# Patient Record
Sex: FEMALE | Employment: FULL TIME | URBAN - METROPOLITAN AREA
[De-identification: names, ages, dates, MRNs, and addresses within clinical notes are randomized per-mention and may not be internally consistent; named-entity substitution may affect disease eponyms.]

---

## 2017-06-15 ENCOUNTER — ALLIED HEALTH/NURSE VISIT (OUTPATIENT)
Dept: NEUROLOGY | Facility: CLINIC | Age: 22
DRG: 101 | End: 2017-06-15
Attending: PSYCHIATRY & NEUROLOGY
Payer: COMMERCIAL

## 2017-06-15 ENCOUNTER — APPOINTMENT (OUTPATIENT)
Dept: CT IMAGING | Facility: CLINIC | Age: 22
DRG: 101 | End: 2017-06-15
Attending: PSYCHIATRY & NEUROLOGY
Payer: COMMERCIAL

## 2017-06-15 ENCOUNTER — HOSPITAL ENCOUNTER (INPATIENT)
Facility: CLINIC | Age: 22
LOS: 1 days | Discharge: HOME | DRG: 101 | End: 2017-06-16
Attending: EMERGENCY MEDICINE | Admitting: PSYCHIATRY & NEUROLOGY
Payer: COMMERCIAL

## 2017-06-15 ENCOUNTER — DOCUMENTATION ONLY (OUTPATIENT)
Dept: NEUROLOGY | Facility: CLINIC | Age: 22
End: 2017-06-15

## 2017-06-15 DIAGNOSIS — R56.9 SEIZURE (H): Primary | ICD-10-CM

## 2017-06-15 DIAGNOSIS — F41.9 ANXIETY: Primary | ICD-10-CM

## 2017-06-15 DIAGNOSIS — R56.9 CONVULSION (H): ICD-10-CM

## 2017-06-15 PROBLEM — R40.4 ALTERED LEVEL OF CONSCIOUSNESS: Status: ACTIVE | Noted: 2017-06-14

## 2017-06-15 PROBLEM — G47.00 INSOMNIA: Status: ACTIVE | Noted: 2017-06-08

## 2017-06-15 PROBLEM — F32.A DEPRESSION: Status: ACTIVE | Noted: 2017-06-08

## 2017-06-15 PROBLEM — R46.89 ABNORMAL BEHAVIOR: Status: ACTIVE | Noted: 2017-06-08

## 2017-06-15 PROBLEM — R53.1 WEAKNESS OF LEFT SIDE OF BODY: Status: ACTIVE | Noted: 2017-04-22

## 2017-06-15 PROBLEM — Z86.19 HISTORY OF VARICELLA: Status: ACTIVE | Noted: 2017-06-15

## 2017-06-15 LAB
ANION GAP SERPL CALCULATED.3IONS-SCNC: 9 MMOL/L (ref 3–14)
BASOPHILS # BLD AUTO: 0 10E9/L (ref 0–0.2)
BASOPHILS NFR BLD AUTO: 0.3 %
BUN SERPL-MCNC: 11 MG/DL (ref 7–30)
CALCIUM SERPL-MCNC: 8.6 MG/DL (ref 8.5–10.1)
CHLORIDE SERPL-SCNC: 104 MMOL/L (ref 94–109)
CO2 BLDCOV-SCNC: 25 MMOL/L (ref 21–28)
CO2 SERPL-SCNC: 25 MMOL/L (ref 20–32)
CREAT BLD-MCNC: 0.5 MG/DL (ref 0.52–1.04)
CREAT SERPL-MCNC: 0.58 MG/DL (ref 0.52–1.04)
DIFFERENTIAL METHOD BLD: NORMAL
EOSINOPHIL # BLD AUTO: 0 10E9/L (ref 0–0.7)
EOSINOPHIL NFR BLD AUTO: 0.4 %
ERYTHROCYTE [DISTWIDTH] IN BLOOD BY AUTOMATED COUNT: 13 % (ref 10–15)
GFR SERPL CREATININE-BSD FRML MDRD: >90 ML/MIN/1.7M2
GFR SERPL CREATININE-BSD FRML MDRD: NORMAL ML/MIN/1.7M2
GLUCOSE SERPL-MCNC: 92 MG/DL (ref 70–99)
HCT VFR BLD AUTO: 42.6 % (ref 35–47)
HGB BLD-MCNC: 14.1 G/DL (ref 11.7–15.7)
IMM GRANULOCYTES # BLD: 0 10E9/L (ref 0–0.4)
IMM GRANULOCYTES NFR BLD: 0.1 %
LACTATE BLD-SCNC: 1.1 MMOL/L (ref 0.7–2.1)
LYMPHOCYTES # BLD AUTO: 2.7 10E9/L (ref 0.8–5.3)
LYMPHOCYTES NFR BLD AUTO: 34 %
MAGNESIUM SERPL-MCNC: 2.4 MG/DL (ref 1.6–2.3)
MCH RBC QN AUTO: 29.8 PG (ref 26.5–33)
MCHC RBC AUTO-ENTMCNC: 33.1 G/DL (ref 31.5–36.5)
MCV RBC AUTO: 90 FL (ref 78–100)
MONOCYTES # BLD AUTO: 0.6 10E9/L (ref 0–1.3)
MONOCYTES NFR BLD AUTO: 7.8 %
NEUTROPHILS # BLD AUTO: 4.5 10E9/L (ref 1.6–8.3)
NEUTROPHILS NFR BLD AUTO: 57.4 %
NRBC # BLD AUTO: 0 10*3/UL
NRBC BLD AUTO-RTO: 0 /100
PCO2 BLDV: 45 MM HG (ref 40–50)
PH BLDV: 7.35 PH (ref 7.32–7.43)
PHOSPHATE SERPL-MCNC: 3.2 MG/DL (ref 2.5–4.5)
PLATELET # BLD AUTO: 220 10E9/L (ref 150–450)
PO2 BLDV: 31 MM HG (ref 25–47)
POTASSIUM SERPL-SCNC: 4.1 MMOL/L (ref 3.4–5.3)
RBC # BLD AUTO: 4.73 10E12/L (ref 3.8–5.2)
SAO2 % BLDV FROM PO2: 57 %
SODIUM SERPL-SCNC: 138 MMOL/L (ref 133–144)
TROPONIN I BLD-MCNC: 0 UG/L (ref 0–0.1)
WBC # BLD AUTO: 7.8 10E9/L (ref 4–11)

## 2017-06-15 PROCEDURE — 95951 ZZHC EEG VIDEO < 12 HR: CPT | Mod: 52,ZF

## 2017-06-15 PROCEDURE — 84484 ASSAY OF TROPONIN QUANT: CPT

## 2017-06-15 PROCEDURE — 25000132 ZZH RX MED GY IP 250 OP 250 PS 637: Performed by: PSYCHIATRY & NEUROLOGY

## 2017-06-15 PROCEDURE — 82565 ASSAY OF CREATININE: CPT

## 2017-06-15 PROCEDURE — 99285 EMERGENCY DEPT VISIT HI MDM: CPT | Mod: Z6 | Performed by: EMERGENCY MEDICINE

## 2017-06-15 PROCEDURE — 80175 DRUG SCREEN QUAN LAMOTRIGINE: CPT | Performed by: EMERGENCY MEDICINE

## 2017-06-15 PROCEDURE — 84100 ASSAY OF PHOSPHORUS: CPT | Performed by: EMERGENCY MEDICINE

## 2017-06-15 PROCEDURE — 12000001 ZZH R&B MED SURG/OB UMMC

## 2017-06-15 PROCEDURE — 82803 BLOOD GASES ANY COMBINATION: CPT

## 2017-06-15 PROCEDURE — 83605 ASSAY OF LACTIC ACID: CPT

## 2017-06-15 PROCEDURE — 99285 EMERGENCY DEPT VISIT HI MDM: CPT | Mod: 25

## 2017-06-15 PROCEDURE — 83735 ASSAY OF MAGNESIUM: CPT | Performed by: EMERGENCY MEDICINE

## 2017-06-15 PROCEDURE — 25000128 H RX IP 250 OP 636: Performed by: PSYCHIATRY & NEUROLOGY

## 2017-06-15 PROCEDURE — 85025 COMPLETE CBC W/AUTO DIFF WBC: CPT | Performed by: EMERGENCY MEDICINE

## 2017-06-15 PROCEDURE — 70450 CT HEAD/BRAIN W/O DYE: CPT

## 2017-06-15 PROCEDURE — 80048 BASIC METABOLIC PNL TOTAL CA: CPT | Performed by: EMERGENCY MEDICINE

## 2017-06-15 RX ORDER — ONDANSETRON 2 MG/ML
4 INJECTION INTRAMUSCULAR; INTRAVENOUS EVERY 6 HOURS PRN
Status: DISCONTINUED | OUTPATIENT
Start: 2017-06-15 | End: 2017-06-16 | Stop reason: HOSPADM

## 2017-06-15 RX ORDER — LAMOTRIGINE 25 MG/1
100 TABLET ORAL 2 TIMES DAILY
Status: DISCONTINUED | OUTPATIENT
Start: 2017-06-15 | End: 2017-06-16 | Stop reason: HOSPADM

## 2017-06-15 RX ORDER — KETOROLAC TROMETHAMINE 30 MG/ML
15 INJECTION, SOLUTION INTRAMUSCULAR; INTRAVENOUS EVERY 6 HOURS PRN
Status: DISCONTINUED | OUTPATIENT
Start: 2017-06-15 | End: 2017-06-16 | Stop reason: HOSPADM

## 2017-06-15 RX ORDER — POLYETHYLENE GLYCOL 3350 17 G/17G
17 POWDER, FOR SOLUTION ORAL DAILY PRN
Status: DISCONTINUED | OUTPATIENT
Start: 2017-06-15 | End: 2017-06-16 | Stop reason: HOSPADM

## 2017-06-15 RX ORDER — POTASSIUM CHLORIDE 1.5 G/1.58G
20-40 POWDER, FOR SOLUTION ORAL
Status: DISCONTINUED | OUTPATIENT
Start: 2017-06-15 | End: 2017-06-16 | Stop reason: HOSPADM

## 2017-06-15 RX ORDER — DIPHENHYDRAMINE HYDROCHLORIDE 50 MG/ML
25 INJECTION INTRAMUSCULAR; INTRAVENOUS ONCE
Status: COMPLETED | OUTPATIENT
Start: 2017-06-15 | End: 2017-06-15

## 2017-06-15 RX ORDER — ONDANSETRON 4 MG/1
4 TABLET, ORALLY DISINTEGRATING ORAL EVERY 6 HOURS PRN
Status: DISCONTINUED | OUTPATIENT
Start: 2017-06-15 | End: 2017-06-16 | Stop reason: HOSPADM

## 2017-06-15 RX ORDER — POTASSIUM CHLORIDE 29.8 MG/ML
20 INJECTION INTRAVENOUS
Status: DISCONTINUED | OUTPATIENT
Start: 2017-06-15 | End: 2017-06-16 | Stop reason: HOSPADM

## 2017-06-15 RX ORDER — CITALOPRAM HYDROBROMIDE 20 MG/1
TABLET ORAL
COMMUNITY
End: 2019-08-28

## 2017-06-15 RX ORDER — PROCHLORPERAZINE 25 MG
25 SUPPOSITORY, RECTAL RECTAL EVERY 12 HOURS PRN
Status: DISCONTINUED | OUTPATIENT
Start: 2017-06-15 | End: 2017-06-16 | Stop reason: HOSPADM

## 2017-06-15 RX ORDER — ACETAMINOPHEN 325 MG/1
650 TABLET ORAL EVERY 4 HOURS PRN
Status: DISCONTINUED | OUTPATIENT
Start: 2017-06-15 | End: 2017-06-16 | Stop reason: HOSPADM

## 2017-06-15 RX ORDER — POTASSIUM CHLORIDE 7.45 MG/ML
10 INJECTION INTRAVENOUS
Status: DISCONTINUED | OUTPATIENT
Start: 2017-06-15 | End: 2017-06-16 | Stop reason: HOSPADM

## 2017-06-15 RX ORDER — AMOXICILLIN 250 MG
1-2 CAPSULE ORAL 2 TIMES DAILY PRN
Status: DISCONTINUED | OUTPATIENT
Start: 2017-06-15 | End: 2017-06-16 | Stop reason: HOSPADM

## 2017-06-15 RX ORDER — POTASSIUM CHLORIDE 750 MG/1
20-40 TABLET, EXTENDED RELEASE ORAL
Status: DISCONTINUED | OUTPATIENT
Start: 2017-06-15 | End: 2017-06-16 | Stop reason: HOSPADM

## 2017-06-15 RX ORDER — PROCHLORPERAZINE MALEATE 5 MG
5-10 TABLET ORAL EVERY 6 HOURS PRN
Status: DISCONTINUED | OUTPATIENT
Start: 2017-06-15 | End: 2017-06-16 | Stop reason: HOSPADM

## 2017-06-15 RX ORDER — POTASSIUM CL/LIDO/0.9 % NACL 10MEQ/0.1L
10 INTRAVENOUS SOLUTION, PIGGYBACK (ML) INTRAVENOUS
Status: DISCONTINUED | OUTPATIENT
Start: 2017-06-15 | End: 2017-06-16 | Stop reason: HOSPADM

## 2017-06-15 RX ORDER — MAGNESIUM SULFATE HEPTAHYDRATE 40 MG/ML
4 INJECTION, SOLUTION INTRAVENOUS EVERY 4 HOURS PRN
Status: DISCONTINUED | OUTPATIENT
Start: 2017-06-15 | End: 2017-06-16 | Stop reason: HOSPADM

## 2017-06-15 RX ORDER — LAMOTRIGINE 100 MG/1
100 TABLET ORAL 2 TIMES DAILY
Status: ON HOLD | COMMUNITY
End: 2019-08-30

## 2017-06-15 RX ORDER — NALOXONE HYDROCHLORIDE 0.4 MG/ML
.1-.4 INJECTION, SOLUTION INTRAMUSCULAR; INTRAVENOUS; SUBCUTANEOUS
Status: DISCONTINUED | OUTPATIENT
Start: 2017-06-15 | End: 2017-06-16 | Stop reason: HOSPADM

## 2017-06-15 RX ADMIN — LAMOTRIGINE 100 MG: 25 TABLET ORAL at 20:18

## 2017-06-15 RX ADMIN — PROCHLORPERAZINE EDISYLATE 10 MG: 5 INJECTION INTRAMUSCULAR; INTRAVENOUS at 23:55

## 2017-06-15 RX ADMIN — KETOROLAC TROMETHAMINE 15 MG: 30 INJECTION, SOLUTION INTRAMUSCULAR at 22:23

## 2017-06-15 RX ADMIN — ACETAMINOPHEN 650 MG: 325 TABLET, FILM COATED ORAL at 18:11

## 2017-06-15 RX ADMIN — ACETAMINOPHEN 650 MG: 325 TABLET, FILM COATED ORAL at 22:23

## 2017-06-15 RX ADMIN — DIPHENHYDRAMINE HYDROCHLORIDE 25 MG: 50 INJECTION, SOLUTION INTRAMUSCULAR; INTRAVENOUS at 23:54

## 2017-06-15 ASSESSMENT — VISUAL ACUITY
OU: BLURRED VISION

## 2017-06-15 ASSESSMENT — ENCOUNTER SYMPTOMS
NAUSEA: 0
FEVER: 0
VOMITING: 0
NUMBNESS: 1
WEAKNESS: 1
ABDOMINAL PAIN: 0
CHILLS: 0
HEADACHES: 0
SHORTNESS OF BREATH: 0

## 2017-06-15 ASSESSMENT — ACTIVITIES OF DAILY LIVING (ADL)
FALL_HISTORY_WITHIN_LAST_SIX_MONTHS: NO
RETIRED_COMMUNICATION: 0-->UNDERSTANDS/COMMUNICATES WITHOUT DIFFICULTY
COGNITION: 0 - NO COGNITION ISSUES REPORTED
SWALLOWING: 0-->SWALLOWS FOODS/LIQUIDS WITHOUT DIFFICULTY
RETIRED_EATING: 0-->INDEPENDENT
BATHING: 0-->INDEPENDENT
AMBULATION: 0-->INDEPENDENT
DRESS: 0-->INDEPENDENT
TOILETING: 0-->INDEPENDENT
TRANSFERRING: 0-->INDEPENDENT

## 2017-06-15 NOTE — CONSULTS
"[facility], [place]      Neurology Stroke Code - Canceled    Patient Name: Libra Lieberman  : 1995 MRN#: [id number]    STROKE DATA     Stroke Code:  Time called:  06/15/2017 1402  Time patient seen:  06/15/2017 1404  Onset of symptoms:  06/15/2017 0600  Last known normal (pt's baseline):  17 2200  Head CT read by Dr. Yousif at:  06/15/2017 1410     Stroke Code canceled at 1430 (time): symptoms not likely a stroke.    Discussed why stroke code canceled with ED physician.    TPA treatment:  Not given due to stroke mimic: conversion.    National Institutes of Health Stroke Scale (at presentation)  NIHSS done at:  time patient seen      Score    Level of consciousness:  (0)   Alert, keenly responsive    LOC questions:  (0)   Answers both questions correctly    LOC commands:  (0)   Performs both tasks correctly    Best gaze:  (0)   Normal    Visual:  (0)   No visual loss    Facial palsy:  (0)   Normal symmetrical movements    Motor arm (left):  (1)   Drift    Motor arm (right):  (0)   No drift    Motor leg (left):  (1)   Drift    Motor leg (right):  (0)   No drift    Limb ataxia:  (0)   Absent    Sensory:  (0)   Normal- no sensory loss    Best language:  (0)   Normal- no aphasia    Dysarthria:  (0)   Normal    Extinction and inattention:  (0)   No abnormality        NIHSS Total Score:  2           ASSESSMENT & RECOMMENDATONS     Stroke code activated due to RUE tingling, BL visual loss. Pt woke up at 6AM with BL black spots in vision that progressively worsened to near-\"blindness.\" still present with each eye closed one at a time. Has hx of neurocysticercosis (confirmed on CTH). Exam c/f conversion reaction with BL symptoms, inconsistent exam lateralization, changing exam for different examiners. Low c/f stroke at this time. Pt said she had a prior seizure wherein she woke up with full body numbness. Sees a neurologist.     Recommendations:   Stroke will sign off at this time. " General neurology consult as patient does have hx of neurocysticercosis and seizure disorder from this on lamictal. Neurology to address possibility of seizures causing symptoms.     The patient was discussed with the Fellow, Dr. Alexander.  The staff is Dr. Álvarez.    Juan Jose Yousif  PGY2 Neurology  [phone]

## 2017-06-15 NOTE — PHARMACY-CONSULT NOTE
Pharmacy Stroke Code Response  Pharmacist responded as part of the Stroke Code Team activation to patient care area -ED and was immediately dismissed, no tPA needed.    Stroke Code was later cancelled.    Virginia Loaiza, PharmD, BCCCP

## 2017-06-15 NOTE — SIGNIFICANT EVENT
Arrival to Stroke Code:1405    Stroke Team escalate/de-escalate interventions: CT, stroke code de-escalated    ED/Bedside Nurse providing handoff: MILY Dunaway    Time left for CT: 1408    Time Returned to ED/Unit: 1418    ED/Bedside Nurse given handoff to & Time: MILY Dunaway at 1418

## 2017-06-15 NOTE — ED PROVIDER NOTES
History     Chief Complaint   Patient presents with     Numbness     HPI  Libra Lieberman is a 21 year old female with a history of neurocysticercosis, BPPV, and seizures who presents for evaluation of numbness. The patient awoke this morning with blurry vision persisting, and within past 1 hour she also developed black spots in vision. No hx of visual problems. Also, at around 10:30 AM today developed persisting tingling and numbness in right hand. These sx concerned her, prompting ER visit.  Prior to today was in her usual state of health. Was recently hospitalized at [facility] on 6/7/17 for seizure vs pseudoseizure spells where she had a normal awake EEG. Pt reports residual weakness in left side 3 days following above seizure vs pseudoseizure event, but which resolved totally prior to today.    I have reviewed the Medications, Allergies, Past Medical and Surgical History, and Social History in the Epic system.    Current Facility-Administered Medications   Medication     lamoTRIgine (LaMICtal) tablet 100 mg     naloxone (NARCAN) injection 0.1-0.4 mg     acetaminophen (TYLENOL) tablet 650 mg     senna-docusate (SENOKOT-S;PERICOLACE) 8.6-50 MG per tablet 1-2 tablet     polyethylene glycol (MIRALAX/GLYCOLAX) Packet 17 g     ondansetron (ZOFRAN-ODT) ODT tab 4 mg    Or     ondansetron (ZOFRAN) injection 4 mg     prochlorperazine (COMPAZINE) injection 5-10 mg    Or     prochlorperazine (COMPAZINE) tablet 5-10 mg    Or     prochlorperazine (COMPAZINE) Suppository 25 mg     potassium chloride SA (K-DUR/KLOR-CON M) CR tablet 20-40 mEq     potassium chloride (KLOR-CON) Packet 20-40 mEq     potassium chloride 10 mEq in 100 mL intermittent infusion     potassium chloride 10 mEq in 100 mL intermittent infusion with 10 mg lidocaine     potassium chloride 20 mEq in 50 mL intermittent infusion     magnesium sulfate 4 g in 100 mL sterile water (premade)     Past Medical History:   Diagnosis Date     H/O magnetic  resonance imaging of brain and brain stem 6/15/2017    MRI BRAIN [facility] CON4/21/2017 [facility] Result Impression IMPRESSION: Chronic sequela of remote neurocysticercosis at the right middle frontal gyrus, stable. Calcification seen left anterior temporal lobe, better seen on prior CT. No new intracranial lesion identified. Result Narrative EXAM: MRI BRAIN WITHOUT AND WITH CONTRAST, 4/21/2017  CLINICAL DATA: History of neurocysticercosi     Neurocysticercosis 2006       History reviewed. No pertinent surgical history.    Family History   Problem Relation Age of Onset     Hypertension Mother      DIABETES Mother      Depression Mother      Lipids Mother      Asthma Brother      Cancer - colorectal Other 13     colon cancer     Thyroid Disease No family hx of      HEART DISEASE No family hx of      Unknown/Adopted No family hx of      Family History Negative No family hx of      C.A.D. No family hx of      CEREBROVASCULAR DISEASE No family hx of      Breast Cancer No family hx of      Prostate Cancer No family hx of      Alcohol/Drug No family hx of      Allergies No family hx of      Alzheimer Disease No family hx of      Anesthesia Reaction No family hx of      Arthritis No family hx of      Blood Disease No family hx of      CANCER No family hx of      Cardiovascular No family hx of      Circulatory No family hx of      Congenital Anomalies No family hx of      Connective Tissue Disorder No family hx of      Endocrine Disease No family hx of      Eye Disorder No family hx of      Genetic Disorder No family hx of      GASTROINTESTINAL DISEASE No family hx of      Genitourinary Problems No family hx of      Gynecology No family hx of      Musculoskeletal Disorder No family hx of      Neurologic Disorder No family hx of      Obesity No family hx of      OSTEOPOROSIS No family hx of      Psychotic Disorder No family hx of      Respiratory No family hx of      Hearing Loss No family hx of        Social  History   Substance Use Topics     Smoking status: Never Smoker     Smokeless tobacco: Never Used     Alcohol use No        Allergies   Allergen Reactions     Bupropion      Other reaction(s): Seizures  Per patient     Levetiracetam Anxiety     Other reaction(s): Behavioral Disturbances   Developed worsening depression, reduced appetite   isturbances       Review of Systems   Constitutional: Negative for chills and fever.   Eyes: Positive for visual disturbance.   Respiratory: Negative for shortness of breath.    Cardiovascular: Negative for chest pain.   Gastrointestinal: Negative for abdominal pain, nausea and vomiting.   Neurological: Positive for weakness (see HPI) and numbness (see HPI). Negative for headaches.   All other systems reviewed and are negative.      Physical Exam     ED Triage Vitals   Enc Vitals Group      BP 06/15/17 1353 113/74      Pulse 06/15/17 1353 95      Resp 06/15/17 1353 18      Temp 06/15/17 1353 99  F (37.2  C)      Temp src 06/15/17 1353 Axillary      SpO2 06/15/17 1353 99 %      Weight 06/15/17 1353 78.3 kg (172 lb 9.9 oz)      Height --       Head Cir --       Peak Flow --       Pain Score --       Pain Loc --       Pain Edu? --       Excl. in GC? --         Physical Exam  GEN: Well appearing, non toxic, cooperative and conversant.    HEENT: The head is normocephalic and atraumatic. Pupils are equal round and reactive to light. Extraocular motions are intact. There is no facial swelling. The neck is nontender and supple.   CV: Regular rate and rhythm without murmurs rubs or gallops. 2+ radial pulses bilaterally.  PULM: Clear to auscultation bilaterally.  ABD: Soft, nontender, nondistended. Normal bowel sounds.   EXT: Weakness in left and right hand, likely volitional. Seems to trigger, decreased strength likely related to effort. SILT.  NEURO: Cranial nerves II through XII are intact and symmetric.   SKIN: No rashes, ecchymosis, or lacerations  PSYCH: Calm and cooperative,  interactive.     ED Course     ED Course     Procedures        Recent Results (from the past 24 hour(s))   CT Head w/o Contrast    Narrative    CT HEAD W/O CONTRAST 6/15/2017 2:18 PM    Provided History: Neurocysticercosis.    Comparison: None available.    Technique: Using multidetector thin collimation helical acquisition  technique, axial, coronal and sagittal CT images from the skull base  to the vertex were obtained without intravenous contrast.     Findings:    Cystic focus in the anterior right frontal lobe measuring 1.3 x 1.1 cm  with a punctate internal 2 mm hyperdensity, and no adjacent vasogenic  edema. 4 mm calcification in the anterior left temporal lobe. No  intracranial hemorrhage, mass effect, or midline shift. The ventricles  are proportionate to the cerebral sulci. The gray to white matter  differentiation of the cerebral hemispheres is preserved. The basal  cisterns are patent.    The visualized paranasal sinuses are clear. The mastoid air cells are  clear.       Impression    Impression:   Neurocysticercosis with evidence of vesicular and chronic calcified  stages.    I have personally reviewed the examination and initial interpretation  and I agree with the findings.    TIM ANDERSON MD            Labs Ordered and Resulted from Time of ED Arrival Up to the Time of Departure from the ED   CREATININE POCT - Abnormal; Notable for the following:        Result Value    Creatinine 0.5 (*)     All other components within normal limits   CBC WITH PLATELETS DIFFERENTIAL   BASIC METABOLIC PANEL   ISTAT  GASES LACTATE SIMON POCT   TROPONIN POCT            Assessments & Plan (with Medical Decision Making)   21-year-old female with history of neurocysticercosis , multiple hospitalizations for evaluation of convulsions, seizure, right-sided weakness.   Prior differential is considered including seizure as well as pseudoseizure. She is known to have a negative EEG, normal CT, and no recent MRIs documented.  The  patient presented to the Emergency Department with clinical signs and symptoms filling criteria for a stroke code activation, which was performed.  Soon afterwards, upon further evaluation and results of her non-con head CT, stroke code was canceled.  In the Emergency Department the patient remained cooperative. Her serial neuro examinations continued to reveal an inconsistent exam, demonstrating occasional right-sided as well as left-sided weakness, highly inconsistent. The patient is alert, easy to engage with, and appears to be cooperative at this time with me    The patient had a large unremarkable work-up at the Emergency Department, including negative head CT.  Discussed with Neurology, she ll be admitted for observation and repeat EEG video monitoring as well as Psychiatry consult for consideration of somatization and conversation disorder.  The patient is agreeable with our plan.    I have reviewed the nursing notes.    I have reviewed the findings, diagnosis, plan and need for follow up with the patient.    Current Discharge Medication List          Final diagnoses:   Convulsion (H)     IRyley, am serving as a trained medical scribe to document services personally performed by Eleazar Clay MD, based on the provider's statements to me.      Eleazar MCNAMARA MD, was physically present and have reviewed and verified the accuracy of this note documented by Ryley Robert.    6/15/2017   [facility], [place], EMERGENCY DEPARTMENT     Eleazar Clay MD  06/15/17 [id number]

## 2017-06-15 NOTE — PHARMACY-ADMISSION MEDICATION HISTORY
Admission medication history interview status for the 6/15/2017 admission is complete. See Epic admission navigator for allergy information, pharmacy, prior to admission medications and immunization status.     Medication history interview sources:  Patient, Home Pharmacy ([facility]--> [phone])    Changes made to PTA medication list (reason)  Added:   Citalopram 20 mg tablet--> Take 1 and 1/2 tablets (30 mg) by mouth daily    Deleted:   N/A    Changed:  Lamotrigine 25 mg tablet: Take 1 tablet by mouth daily--> Lamotrigine 100 mg tablet: Take 1 tablet by mouth twice daily    Additional medication history information (including reliability of information, actions taken by pharmacist):    -Patient good historian  -Verified with home pharmacy the doses of both citalopram and lamotrigine  -Home pharmacy confirmed  of lamotrigine at documented dose on 6/5/2017  -Home pharmacy also confirmed citalopram not picked up due to being refill too soon indicating that patient picked up at another pharmacy      Prior to Admission medications    Medication Sig Last Dose Taking? Auth Provider   lamoTRIgine (LAMICTAL) 100 MG tablet Take 100 mg by mouth 2 times daily 6/15/2017 at Morning Yes Unknown, Entered By History   citalopram (CELEXA) 20 MG tablet Take 1 and 1/2 tablets (30 mg) by mouth daily 6/15/2017 at Morning Yes Unknown, Entered By History         Medication history completed by: Gerry Knight PD4

## 2017-06-15 NOTE — ED NOTES
Pt arrived in the ED with reports of right arm numbness and blurred vision since 10:30-11 AM; states she was unable to grasp a cup at work.  Pt is awake, alert, and oriented x4; pt is ambulatory with a steady gait.

## 2017-06-15 NOTE — H&P
"[facility]: [place]  Neurology History and Physical  Patient Name:  Libra Lieberman  MRN:  [id number]    :  1995  Date of Admission:  6/15/2017  Date of Service:  Lisa 15, 2017  Primary care provider:  Dinah Smith      Chief Complaint:  Spells, weakness    History of Present Illness:   21 year old female h/o neurocystticercosis who presents with multiple symptoms. She states the following:    - Previously in her normal state of health  - Woke up this morning at 6am with BL visual loss accompanied by black spots that progressively worsened to \"near-blindness\"  - This was accompanied by generalized weakness  - Became concerned over the course of the day and so decided to present to the ED  - Stroke code called in the ED. NIH 2 due to drift of the left arm and left leg. CT head was negative and due to changing symptoms, this was not felt to be a stroke. Concern then became possible conversion reaction.  - General team then consulted  - Patient stated a years long history of spells an at least 2 seizures in the last 2 weeks. Unclear if these were witnessed. Does not appear to have involved tongue biting or bowel/bladder incontinence. Unclear per patient if there is a post-ictal period or how long it may last.     Patient denies any recent head traumas. No fevers or other illnesses. No sleep deprivation. No constipation, diarrhea, nausea, vomiting. No chest pain or SOB. No heat/cold intolerance.     ROS: A 10-point ROS was performed, negative except as per HPI.    PMH:  Past Medical History:   Diagnosis Date     H/O magnetic resonance imaging of brain and brain stem 6/15/2017    MRI BRAIN [facility] CON2017 [facility] Result Impression IMPRESSION: Chronic sequela of remote neurocysticercosis at the right middle frontal gyrus, stable. Calcification seen left anterior temporal lobe, better seen on prior CT. No new intracranial lesion identified. Result Narrative " EXAM: MRI BRAIN WITHOUT AND WITH CONTRAST, 4/21/2017  CLINICAL DATA: History of neurocysticercosi     Neurocysticercosis 2006     History reviewed. No pertinent surgical history.    Allergies:  Allergies   Allergen Reactions     Bupropion      Other reaction(s): Seizures  Per patient     Levetiracetam Anxiety     Other reaction(s): Behavioral Disturbances   Developed worsening depression, reduced appetite   isturbances       Medications:      Current Facility-Administered Medications:      lamoTRIgine (LaMICtal) tablet 100 mg, 100 mg, Oral, BID, Jesse Head MD     naloxone (NARCAN) injection 0.1-0.4 mg, 0.1-0.4 mg, Intravenous, Q2 Min PRN, Jesse Head MD     acetaminophen (TYLENOL) tablet 650 mg, 650 mg, Oral, Q4H PRN, Jesse Head MD     senna-docusate (SENOKOT-S;PERICOLACE) 8.6-50 MG per tablet 1-2 tablet, 1-2 tablet, Oral, BID PRN, Jesse Head MD     polyethylene glycol (MIRALAX/GLYCOLAX) Packet 17 g, 17 g, Oral, Daily PRN, Jesse Head MD     ondansetron (ZOFRAN-ODT) ODT tab 4 mg, 4 mg, Oral, Q6H PRN **OR** ondansetron (ZOFRAN) injection 4 mg, 4 mg, Intravenous, Q6H PRN, Jesse Head MD     prochlorperazine (COMPAZINE) injection 5-10 mg, 5-10 mg, Intravenous, Q6H PRN **OR** prochlorperazine (COMPAZINE) tablet 5-10 mg, 5-10 mg, Oral, Q6H PRN **OR** prochlorperazine (COMPAZINE) Suppository 25 mg, 25 mg, Rectal, Q12H PRN, Jesse Head MD     potassium chloride SA (K-DUR/KLOR-CON M) CR tablet 20-40 mEq, 20-40 mEq, Oral, Q2H PRN, Jesse Head MD     potassium chloride (KLOR-CON) Packet 20-40 mEq, 20-40 mEq, Oral or Feeding Tube, Q2H PRN, Jesse Head MD     potassium chloride 10 mEq in 100 mL intermittent infusion, 10 mEq, Intravenous, Q1H PRN, Jesse Head MD     potassium chloride 10 mEq in 100 mL intermittent infusion with 10 mg lidocaine, 10 mEq, Intravenous, Q1H PRN, Jesse Head MD     potassium chloride 20 mEq in 50 mL intermittent  infusion, 20 mEq, Intravenous, Q1H PRN, Jesse Head MD     magnesium sulfate 4 g in 100 mL sterile water (premade), 4 g, Intravenous, Q4H PRN, Jesse Head MD    Social History:  Social History   Substance Use Topics     Smoking status: Never Smoker     Smokeless tobacco: Never Used     Alcohol use No       Family History:    Family History   Problem Relation Age of Onset     Hypertension Mother      DIABETES Mother      Depression Mother      Lipids Mother      Asthma Brother      Cancer - colorectal Other 13     colon cancer     Thyroid Disease No family hx of      HEART DISEASE No family hx of      Unknown/Adopted No family hx of      Family History Negative No family hx of      C.A.D. No family hx of      CEREBROVASCULAR DISEASE No family hx of      Breast Cancer No family hx of      Prostate Cancer No family hx of      Alcohol/Drug No family hx of      Allergies No family hx of      Alzheimer Disease No family hx of      Anesthesia Reaction No family hx of      Arthritis No family hx of      Blood Disease No family hx of      CANCER No family hx of      Cardiovascular No family hx of      Circulatory No family hx of      Congenital Anomalies No family hx of      Connective Tissue Disorder No family hx of      Endocrine Disease No family hx of      Eye Disorder No family hx of      Genetic Disorder No family hx of      GASTROINTESTINAL DISEASE No family hx of      Genitourinary Problems No family hx of      Gynecology No family hx of      Musculoskeletal Disorder No family hx of      Neurologic Disorder No family hx of      Obesity No family hx of      OSTEOPOROSIS No family hx of      Psychotic Disorder No family hx of      Respiratory No family hx of      Hearing Loss No family hx of        Physical Examination:   Vitals:  B/P: 97/66, T: 98.4, P: 82, R: 16  General: Alert, NAD  HEENT: Atraumatic  Cardiac: RRR  Pulm: CTAB  Abdomen: Soft, non-tender  Extremities: No edema  Skin: Warm, dry  Psych:  Pleasant affect  Neuro:  -MS: Alert, awake, following commands. Naming, registration, recall intact.  -CN: PERRL, EOMI. Face symmetric. Tongue protrusion midline  -Motor: Mild drift of right arm and both legs. 4+/5 strength diffusely  -Sensory: intact to LT, PP in all extremities  -Reflexes: 2+ and symmetric  -Coordination:  -Gait    Investigations:  Reviewed available labs/imaging.    Assessment and Plan:  21 year old female h/o neurocystticercosis who presents with multiple symptoms including blurred vision and shifting weakness. Initial concern for stroke though CT head was negative. Immediate concern for seizures and so admitted for monitoring.    # Spells:  # Shifting weakness:  Reason for admission. Chief concern would be for seizures. Does have recent notes indicating concern for conversion reactions. MRI brain done in April 2017 which was negative for new pathology. If seizures, unclear trigger per history.  - Admit to neurology  - vEEG  - Continue PTA lacosamide  - Lacosamide level  - CBC, BMP     # Hx of neurocystercisosis:  Dx 7 years ago. States she has never been treated.   - Consult ID     Diet: Regular adult diet  IVF: Eating and drinking  Bowel ppx: None  DVT ppx: Ambulate    Code: Full    Patient was seen and discussed with Dr. Roly Head MD  Neurology PGY2  Pgr: [id number]  .     PATIENT SEEN AND EXAMINED BY ME on Lisa 15, 2017 I AGREE WITH THE FINDINGS DETAILED BY THE NEUROLOGY RESIDENT and documented in their note on Lisa 15, 2017   PLEASE REFER TO THEIR NOTE FOR ADDITIONAL DETAILS.       Patient consented to allow us access to care everywhere to get her records. When seen by me she had fluttering of her eyelids and was slow to respond and was able to follow commands then fairly rapidly was talking coherently explaining some of her complicated medical history which has taken her to numerous institutions and has been on a variety of different AED medications with possible ae's from  the AEDs. There presumably are structurally changes with nonepileptic spells and possibly epileptic spells. She has seen Dr. Barron twice at  and is on lamotrigine 100 or 150mg twice daily. She is also on celexa for depression    DIAGNOSIS as above  MANAGEMENT as above.    ZAN FRANCO MD

## 2017-06-15 NOTE — IP AVS SNAPSHOT
[address] [address]    Phone:  [phone]                                       After Visit Summary   6/15/2017    Libra Lieberman    MRN: [id number]           After Visit Summary Signature Page     I have received my discharge instructions, and my questions have been answered. I have discussed any challenges I see with this plan with the nurse or doctor.    ..........................................................................................................................................  Patient/Patient Representative Signature      ..........................................................................................................................................  Patient Representative Print Name and Relationship to Patient    ..................................................               ................................................  Date                                            Time    ..........................................................................................................................................  Reviewed by Signature/Title    ...................................................              ..............................................  Date                                                            Time

## 2017-06-15 NOTE — PROGRESS NOTES
Progress Notes  - in this encounter    Table of Contents for Progress Notes  Miroslava Bell, RN - 06/10/2017 2:14 PM CDT  Dalia Roman, EEGT - 06/10/2017 1:09 PM CDT  Dalia Roman, EEGT - 06/10/2017 1:09 PM CDT  Won Chapman MD - 06/10/2017 12:00 PM CDT  Mariana Bucio MD - 06/10/2017 8:57 AM CDT  Korin Coombs PA-C - 2017 1:27 PM CDT  Lainey Kuo APRN, CNP - 2017 10:00 AM CDT  Yaya Gates PT - 2017 12:01 PM CDT  Lainey Kuo APRN, CNP - 2017 11:00 AM CDT  Quinn Triana - 2017 8:12 AM CDT  Krupa Tobar RN - 2017 6:21 PM CDT     Miroslava Bell RN - 06/10/2017 2:14 PM CDT  DISCHARGE    O: Patient safely discharged to home.     D: Patient is alert and oriented x 4. Pt up independently . Discharge criteria met. Vaccines addressed prior to discharge.     A: Discharge instructions and medications reviewed and given to patient. Prescriptions filled by [facility] pharmacy. Belongings checklist reviewed with patient and belongings sent. Pt missing her high heeled black shoes from the ER. Unable to reach anyone in lost and found. Security able to look for shoes, but not able to release them to her if found. Pt will call lost and found on Monday. Care plan issues addressed and education record updated.    R: Patient verbalizes understanding and teaches back discharge instructions. Patient discharged by: wheelchair with staff. Miroslava Bell RN 2:15 PM 6/10/2017        Back to top of Progress Notes  Dalia Roman, EEGT - 06/10/2017 1:09 PM CDT  M- Video Day 3    Back to top of Progress Notes  Dalia Roman, EEGT - 06/10/2017 1:09 PM CDT  M- Video day 2    Back to top of Progress Notes  Won Chapman MD - 06/10/2017 12:00 PM CDT      NAME: CADY APARICIO MR#: [id number]  CSN: [id number]  AUTHENTICATING CLINICIAN: Won Chapman MD  CONFIRM #: [id number]  LOC: 1    HOSPITAL PROGRESS NOTE    DATE OF VISIT: 06/10/2017  :  1995    INTERVAL HISTORY:  No acute events overnight. This morning, the patient states she feels well and she is eager to be discharged.    OBJECTIVE:  On focused examination, mental status: The patient is alert. She is speaking fluently. There is no word-finding difficulty. Eye movements appear to be full and conjugate. Facial expressions are symmetric. There is no evidence for dysarthria. I did not appreciate any grossly ataxic movements of the upper extremities.    RELEVANT RESULTS:  Continuous video EEG monitoring over the past 24 hours has been normal.    DIAGNOSES:  1. History of neurocysticercosis.  2. History of functional examination elements.  3. Depression and anxiety.    ASSESSMENT:  I am encouraged that Libra has had no further events and her EEG record has been normal thus far. Yesterday, her Lamictal was discontinued in an effort to elicit any potential EEG abnormalities or further events. The patient has been stable without any recurrence. The nature of events are unclear. She has been seen by a number of neurology staff who have raised concern for functional etiologies. No event has been captured on EEG or video. Therefore, we will resume her prior to admission Lamictal at 100 mg twice daily. I will disconnect her video EEG monitoring. The patient may discharge later today. Fortunately, she has scheduled followup with Dr. Webb on 06/21/2017.     Thank you for involving Neurology in the care of Libra Lieberman.     Please contact me with any question or concern.    Won Chapman M.D.  Neurology  [phone] (pager)    TOTAL TIME:  25 minutes. Counseling care, coordination time 15 minutes.    JPW:DARA C:   D:06/10/17 11:08 T: 06/10/17 11:37 CONFIRM #: [id number]      Back to top of Progress Notes  Mariana Bucio MD - 06/10/2017 8:57 AM CDT  Formatting of this note may be different from the original.  HOSPITALIST SERVICE PROGRESS NOTE    Admit Date: 6/7/2017    SUBJECTIVE    Complains of low  "back pain for the past 3 days. Pain radiates to the thighs. No leg numbness or weakness. Denies fall. Ambulating without difficulty    OBJECTIVE    Filed Vitals:   06/09/17 1600 06/09/17 1840 06/10/17 0038 06/10/17 0800   BP: 108/72 101/63 95/60   Pulse: 83 84 80 77   Resp: 16 16 16 16   Temp: 36.9  C (98.5  F) 37.1  C (98.7  F) 37  C (98.6  F) 37.1  C (98.8  F)   TempSrc: Oral Oral Oral Oral   SpO2: 97% 95% 98% 96%   Weight: 76.2 kg (168 lb)     I/O last 3 completed shifts:  In: 930 [Oral:930]  Out: -     GENERAL: Alert, no acute distress, well oriented  LUNGS: CTA, No wheezing, No Crackles  HEART: RRR  ABDOMEN: Non tender, BS+, No organomegaly  EXT: No edema bilaterally  NEURO: CNII to XII intact, moves all extremities  MUSCK: Tenderness on palpation of the lumbar spinal and paraspinal area    LABS  None    ASSESSMENT/PLAN    This is a 21 year old female with a hx of neurocysticercosis who was recently hospitalized from 4/21 to 4/23 for left sided weakness. She presented with arm twitching and generalized weakness    1. Spell, seizure vs pseudoseizure    On continuous EEG monitoring  Neurology following  On Lamictal for possible seizure since Fall 2016. Holding Lamictal while monitoring patient for seizures on EEG    2. Depression/anxiety  3. Hx of neurocysticercosis in 2010  4. Low back pain, musculoskeletal pain. Lidocaine patch, acetaminophen    Mariana Bucio MD  [facility]  8:57 AM 6/10/2017        Back to top of Progress Notes  Korin Coombs PA-C - 06/09/2017 1:27 PM CDT  NEUROLOGY PROGRESS NOTE  June 9, 2017    SUBJECTIVE: \"Paralysis\" has resolved. Still appears to exhibit a great deal of effort to do strength testing. Continues to have back and thigh pain. No further black outs. Very stressed about all of this; crying.    OBJECTIVE:  /61  Pulse 75  Temp 99  F (37.2  C) (Oral)  Resp 16  Wt 167 lb 12.3 oz (76.1 kg)  LMP 05/21/2017  SpO2 98%  Breastfeeding? No  BMI 31.7 " "kg/m2   GENERAL: Alert, in no distress   NEUROLOGIC:  Mental Status: Alert and oriented to person, place, time. Speech clear without dysarthria. No aphasia. Follows 3-step commands appropriately.   Cranial Nerves: Pupils equal. Extraocular motility is intact in all directions. Visual fields intact to direct confrontational testing in all quadrants. Facial sensation intact bilaterally. Face symmetric, with symmetric smile. Eyes close with equal strength. Eyebrows raise symmetrically.   Motor: No drift of the upper extremities. Brief, 5/5 strength of deltoids, biceps, triceps able to be elicited bilaterally. Wrist flexion/extension 4/5.  is weak bilaterally, question effort. Exhibits a great deal of effort to lift her legs. 3/5 hip flexion/ext, 4/5 knee extension, she is too tired to do more testing.   Coordination: Intact finger-to-nose   Sensory: Intact to light touch throughout. Intact symmetric vibratory sensation and proprioception. No neglect.    IMAGIN. Head CT 2017: No acute intracranial findings. As described on the prior MRI, there is a small, peripheral, cystic lesion in the right frontal lobe measuring 10 x 10 mm, smaller than on the MRI. This demonstrates a small central calcification, but is otherwise CSF attenuation. This was also present on the prior head CT.  2. Continuous EEG 17: Normal awake and sleep video-EEG telemetry.    ASSESSMENT:  Libra Lieberman is a 21-year-old female with a history of neurocysticercosis, depression, anxiety, and possible seizure disorder, who was admitted on 2017 after presenting with loss of consciousness and right-sided weakness. She recalls \"blacking out,\" but then has no recollection following that. She had been complaining of right upper extremity twitching with onset 2017, and had an EEG just prior to the blackout episode, which was normal. Following the blackout, she had diffuse right-sided weakness, as well as weakness on the left " side, though exam was inconsistent. Exam had some functional elements. A functional disorder has been suggested in the past, notably during a recent hospital admission in 04/2017 for similar-type episodes. She has an extensive psychiatric history. However, given her history of neurocysticercosis, she does have valid source for seizures. Also to consider are combination of pseudoseizures and seizures.    DIAGNOSES:  1. Spells: seizure versus pseudoseizure, possibly both.  2. Neurocysticercosis.  3. Depression  4. Anxiety    RECOMMENDATIONS:  1. Continuous EEG monitoring has been normal. She has not had any further spells. We will stop her lamictal today to see if this will precipitate any of her symptoms and allow us to better characterize them. Lamictal was started for concern of seizure, with symptoms of arm jerking. The black out spells are a new thing for her, monitor for any recurrence of these as well.  2. Discussed with her that she is not to be driving for 3 months due to loss of consciousness of unknown cause.  3. If she doesn't have spells once the lamictal is stopped, would recommend discharging with her back on it and follow up with her neurologist, Dr Barron.    This patient and plan have been discussed with attending physician, Dr. Henrry Ch, and the above plan has been made in conjunction with Dr. Ch.    Korin Coombs PA-C  Neurology      Back to top of Progress Notes  Lainey Kuo, NIKO, CNP - 06/09/2017 10:00 AM CDT  Formatting of this note may be different from the original.  DAILY PROGRESS NOTE    Admit Date: 6/7/2017  Today's Date: 6/9/2017    CC: Weakness    Subjective:     Libra was seen on AM rounds this morning. She was found sitting up in bed. Her concerns today include the same concerns of generalized weakness with R>L, although she reports this is improved from yesterday. In fact, she has been able to ambulate today in her room and has also been working on her   "duties without difficulty. During this admission she denies having any further \"episodes.\" She continues to have low back pain that radiates into both thighs. This is improved by Flexeril and Toradol administration. She is tolerating PO without incident. No new concerns at this time.    Objective:   Vitals:  /61  Pulse 75  Temp 37.2  C (99  F) (Oral)  Resp 16  Wt 76.1 kg (167 lb 12.3 oz)  LMP 05/21/2017  SpO2 98%  Breastfeeding? No  BMI 31.7 kg/m2   I/O last 3 completed shifts:  In: 640 [Oral:640]  Out: -     General: Patient is alert, well oriented and in no acute distress  HEENT: PERRL, conjunctiva clear, mucous membranes moist  NECK: Supple, no adenopathy  LUNGS: Clear to auscultation bilaterally, no wheezes or crackles  HEART: RRR, No murmur, nml S1/S2, no gallops or rubs  ABDOMEN: Soft, flat, non-distended, non-tender, bowel sounds present  EXTREMITIES: no edema, no cyanosis, + pulses, moves all extremities, able to reposition self in bed - including sitting up - with good truncal support, able to lift legs and arms off bed without drift, dorsi/plantar flexion WNL; bilateral hand  weak, but able to  cup  Skin: No gross lesions or rashes  NEUROLOGIC: CN II - XII grossly intact, normal strength and sensation  PSYCH: Appropriate affect and eye contact, engages appropriately in conversation     Meds: reviewed in chart    Continuous EEG summary (start time 6/8/2017 12:20:03; end time: 6/9/2017 at 00:00:00)  Normal awake and sleep video-EEG telemetry    LABS:  Lamictal (Lamotrigine) level: 4.6 (2.5-15.0 ug/mL)    Assessment/Plan     1. Non-epileptic vs Epileptic Spells: Mgmt per neurology, no events since admission, continuous VEEG started yesterday and has been WNL thus far. Thus far EKG, CT head, UA, UDS, ETOH, CBC, BMP, lyme units and smear, magnesium and hepatic function panel are all unremarkable. Patient refused lumbar puncture. Loaded with fosphenytoin in ED. Home Lamictal therapeutic on " admit, DC'd per Neuro in hopes to induce event while on EEG. Ativan ordered PRN. PT consult due to concerns for weakness. No further imaging is indicated per Neuro.  2. Low back pain with generalized weakness: CT abdomen/pelvis benign. Exam inconsistent on repeat testing. Patient refused lumbar puncture in ER. Continue acetaminophen, cyclobenzaprine, and ketorolac for pain mgmt. PT consult.  3. History of neurocysticercosis: Noted. Seizure evaluation per #1.  3. Anxiety/ Depression: Will continue home citalopram.  4. Insomnia: Will continue home trazodone PRN.  5. DVT prophylaxis: Low risk as patient is ambulatory.  6. Disposition: Per neuro. Pending clinical course    Lainey Kuo [facility]  Pager [phone]    Back to top of Progress Notes  Yaya Gates, PT - 06/08/2017 12:01 PM CDT  Physical Therapy  Received message to see pt bedside, pt currently getting hooked up to EEG. Will attempt later today if schedule allows or will try tomorrow. Yaya Gates, PT 12:02 PM 6/8/2017        Back to top of Progress Notes  Lainey Kuo S, APRN, CNP - 06/08/2017 11:00 AM CDT  Formatting of this note may be different from the original.  DAILY PROGRESS NOTE    Admit Date: 6/7/2017  Today's Date: 6/8/2017    CC: Weakness and low back pain    Subjective:     Libra is a pleasant 21-year-old female with concerns of generalized weakness, but feels that her right side is weaker than her left. She denies having any headache, chest pain, shortness of breath, abdominal pain, or difficulty swallowing. She also reports having low back pain that is not exacerbated or completely alleviated by any one factor; although, prescribed regimen does provide some relief. She denies any seizure-like activity since admission that initially prompted her stay. She does not report any new concerns at this time.    Objective:   Vitals:  BP 90/53  Pulse 86  Temp 36.8  C (98.2  F) (Oral)  Resp 16  Wt 75.5 kg (166 lb 7.2 oz)  LMP 05/21/2017  SpO2 99%   Breastfeeding? No  BMI 31.45 kg/m2       GENERAL: Patient is alert, well oriented and in no acute distress  HEENT: PERRL, conjunctiva clear, mucous membranes moist  NECK: supple, no adenopathy  LUNGS: Clear to auscultation bilaterally, no wheezes or crackles  HEART: RRR, no murmur, nml S1/S2, no gallops or rubs  ABDOMEN: Soft, flat, non-distended, non-tender, bowel sounds present  EXTREMITIES: no edema, no cyanosis, + pulses, moves all extremities, able to reposition self in bed with good truncal support, able to lift legs and arms off bed without drift, able to hold glass of water and take medications; some inconsistencies in exam when asked to test other areas of extremity strength such as hand  and dorsi/plantar flexion  SKIN: no gross lesions or rashes  NEUROLOGIC: CN II - XII grossly intact  PSYCH: appropriate affect and eye contact, engages appropriately in conversation    Meds: reviewed in chart    LABS:  Last BMP:   Recent Labs   06/07/17  1427   CREATININE 0.71   GLUCOSE 103*   BICARB 25   CHLORIDE 108   K 3.9   SODIUM 142   BUN 11   CA 9.3   GFR >60  >60     Last CBC:   Recent Labs   06/07/17  1427   WBC 8.9   RBC 4.38   HGB 13.0   HCT 39.3   MCV 89.7   RDW 13.0   PLTS 230     Assessment/Plan     1. 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 Thus far EKG, CT head, UA, UDS, ETOH, CBC, BMP, lyme units and smear, magnesium and hepatic function panel are all unremarkable. Patient refused lumbar puncture. Loaded with fosphenytoin in ED. Continue home dose of Lamictal for now. No events since admission. Ativan ordered PRN. PT consult due to concerns for weakness. No further imaging is indicated per Neuro.  2. Low back pain with weakness: CT abdomen/pelvis reviewed. Patient refused lumbar puncture. Continue acetaminophen and cyclobenzaprine. Add toradol. PT consult.  3. History of neurocysticercosis: Noted. Seizure evaluation per #1.  3. Anxiety/ Depression: Will continue  "home citalopram.  4. Insomnia: Will continue home trazodone PRN.  5. DVT prophylaxis: Low risk as patient is ambulatory.  6. Disposition: Pending clinical course.    Lainey Kuo [facility]  Pager [phone]      Back to top of Progress Notes  Kong Quinn SUSANNAH - 06/08/2017 8:12 AM CDT  Day 1 Video EEG #M- completed    Back to top of Progress Notes  Krupa Tobar RN - 06/07/2017 6:21 PM CDT  ADMIT    O: Admitted patient via cart from EC to bed # [id number].    D: Patient is oriented x 4; family present.     See Admission Assessments.    A: Discussed plan of care. See education record for admission education. Oriented to room. Call light in reach. Bed alarm: on    R: Patient status: stable. Will monitor.    Back to top of Progress Notes  H&P Notes  - in this encounter    Josesito Menjivar MD - 06/07/2017 5:20 PM CDT  Formatting of this note may be different from the original.  HISTORY AND PHYSICAL    Primary provider: Dinah Smith MD    SUBJECTIVE:    Patient is a 21 y.o. female who presents with a possible seizure. She was hospitalized here in April with left sided weakness, questionable focal seizure. Bupropion was discontinued. She has history of neurocysticercosis and seizures. Her usual symptom is unresponsiveness.     She had EEG this morning. She then drove back home. She was then going somewhere with her daughter. She was sitting in the car and turned to the back to check her daughter's seat belt. She felt like something was going to happen. She felt \"rush\" and then everything went black. She woke up slumped over in the  seat. Her back was hurting and she felt weak all over.     Her mom called EMS and the patient was brought to the ER. She was lethargic, per ED physician, but immediately woke up when the ED physician was going to do spinal tap (that was cancelled). She became lethargic again, so acetaminophen suppository was ordered for back pain. She again woke up quickly for that. "     When I came to examine her, she wouldn't open her eyes, was uncooperative with the exam and was only answering my questions at times. When her mother who was sitting on patient's left side started talking, patient's left arm and hand started twitching. I was asking the patient about alcohol drinking and since she wasn't answering I asked her to show me using her fingers, how many times a week she drinks. She raised the left index finger. When I asked her to show me how may drinks she usually has each time, she raised two fingers of the left hand.    She was uncooperative with the rest of my exam. I talked to her family. Two sisters spoke English and they interpreted for their mother, who speaks Turkmen.    I waited a few minutes until the patient was taken to CT scan. She was completely awake. I again examined her in the CT waiting area. She was complaining of low back central pain, non-radiating. She said she felt weak all over. She said she drinks 1-2 times per week. She had dinner with friends on Friday, 6/2/17, and had four drinks. On Saturday, 6/3/17, she had one beer. No alcohol since.    Patient Active Problem List   Diagnosis     History of cholecystectomy     Contraceptive management     History of chicken pox     Vertigo     Cyst of brain     Anxiety ([facility])     Benign paroxysmal positional vertigo     Nondependent cocaine abuse, episodic     Neurocysticercosis     Low grade squamous intraepithelial lesion (LGSIL) on cervicovaginal cytologic smear     Acquired absence of other organs     Left-sided weakness     Seizure ([facility])     Past Medical History:   Diagnosis Date     Neurocysticercosis   arm jerking, optho issues. On Lamictal for seizure prophylaxis.     Urinary tract infection   Recurrent as child     Varicella     Past Surgical History:   Procedure Laterality Date     CHOLECYSTECTOMY   age 11     CHOLECYSTECTOMY 2009   age 11     WISDOM TEETH EXTRACTION   surgery pending     Allergies   Allergen  Reactions     Keppra [Levetiracetam] Other, see comments   Developed worsening depression, reduced appetite     Social History   Substance Use Topics     Smoking status: Former Smoker   Packs/day: 0.10   Types: Cigarettes     Smokeless tobacco: Never Used     Alcohol use 0.0 oz/week   0 Standard drinks or equivalent per week   Comment: 2-3 drinks a week     Family History   Problem Relation Age of Onset     High Cholesterol Birth Mother     Hypertension Birth Mother     Diabetes Birth Mother     Diabetes Maternal Aunt     Thyroid Disorder Maternal Aunt     Cancer, Colon Cousin    age 13     (Done while patient in hospital bed)   Medications prior to admission   Medication Sig Dispense Refill     citalopram (CELEXA) 20 MG tablet Take 1.5 Tabs by mouth daily. REPLACES ALL OTHER SCRIPTS. 45 Tab 0     lamoTRIgine (LAMICTAL) 100 MG tablet Take 1 Tab by mouth two times a day. 60 Tab 0     ondansetron (ZOFRAN) 4 MG tablet Take 1 Tab by mouth every 8 hours as needed for Nausea. 20 Tab 0     traZODone (DESYREL) 50 MG tablet 50-150mgs at bedtime for insomnia 90 Tab 1     Review of Systems  A comprehensive review of systems was negative except for symptoms reported in the HPI.    OBJECTIVE:    /55  Pulse 97  Temp 37.2  C (99  F) (Oral)  Wt 75.5 kg (166 lb 7.2 oz)  SpO2 97%  BMI 31.45 kg/m2  General appearance: alert, cooperative, no distress, appears stated age  Head: Normocephalic, without obvious abnormality, atraumatic  Eyes: conjunctivae/corneas clear. PERRL, EOM's intact.   Throat: lips, mucosa, and tongue normal;   Neck: supple, symmetrical, trachea midline, no adenopathy, no JVD  Lungs: clear to auscultation bilaterally  Heart: regular rate and rhythm, S1, S2 normal, no murmur, click, rub or gallop  Abdomen: soft, non-tender; bowel sounds normal; no masses, no organomegaly  Extremities: extremities normal, atraumatic, no cyanosis or edema  Pulses: 2+ and symmetric  Skin: Skin color, texture, turgor  normal. No rashes or lesions  Neurologic: Alert and oriented X 3. CN 2-12 intact. Patient is uncooperative with testing muscle strength. She will not make a firm  but when I raised her arms and asked to hold them up, she did it without difficulty.     ECG: rhythm: normal sinus rhythm, rate=97 bpm, nonspecific ST-T wave abnormality, otherwise WNL, no change since previous ECG dated 8/22/16.     Imaging:   CT Head WO IV Cont [[id number]] Collected: 06/07/17 1414     Order Status: Completed Updated: 06/07/17 1439     Narrative:       COMPARISON:  Outside head CT dated 09/07/2011. Correlation is made with a more recent MRI of the brain dated 08/23/2016.    TECHNIQUE:  Axial images were obtained through the head without contrast.    FINDINGS:  There is no evidence of intracranial hemorrhage, mass effect, midline shift, acute infarct or hydrocephalus.  There are no abnormal intraaxial or extraaxial fluid collections. As was described on the prior MRI, there is a small, peripheral, cystic lesion in the right frontal lobe measuring 10 x 10 mm, smaller than on the MRI. This demonstrates a small central calcification but is otherwise CSF attenuation. This was also present on the prior head CT.    Brain parenchyma, ventricles, sulci and cisternal areas otherwise appear normal and there are no areas of abnormal brain density.  Visualized paranasal sinuses are well-aerated and clear.  Calvaria and skull base appear intact and bony structures are unremarkable.      Impression:       IMPRESSION:  No acute intracranial findings.     CT Abd Pelvis W IV Cont Only [[id number]] Collected: 06/07/17 1719     Order Status: Completed Updated: 06/07/17 1741     Narrative:       COMPARISON:  None.    TECHNIQUE:  Images were obtained through the abdomen and pelvis following the administration of  75 mL IOPAMIDOL 61 % IV SOLN contrast.    FINDINGS: The visualized lung bases are clear. The visualized distal esophagus is unremarkable.    The  patient is status post cholecystectomy. There is mild focal fatty infiltration in the left lobe the liver adjacent to the falciform ligament. No focal liver mass. The spleen, pancreas, adrenal glands and kidneys are normal. No hydronephrosis or definite ureteral stone.    There is trace free fluid in the pelvis. No significant adenopathy in the abdomen or pelvis. No focal inflammatory change or bowel wall thickening. The appendix is normal. No hernia or bowel obstruction.    Review of bone windows is negative.      Impression:       IMPRESSION:    1. No acute findings in the abdomen or pelvis.  2. Mild focal fatty infiltration in the left lobe of the liver.  3. Trace free fluid in the pelvis is nonspecific but likely physiologic.     Labs:   Last BMP:   Recent Labs   06/07/17  1427   CREATININE 0.71   GLUCOSE 103*   BICARB 25   CHLORIDE 108   K 3.9   SODIUM 142   BUN 11   CA 9.3   GFR >60  >60     Last CBC:   Recent Labs   06/07/17  1427   WBC 8.9   RBC 4.38   HGB 13.0   HCT 39.3   MCV 89.7   RDW 13.0   PLTS 230     Last INR:   Recent Labs   06/07/17  1427   INR 1.0   LABPROT 13.0     Last Liver profile:   Recent Labs   06/07/17  1423   ALKPHOS 70   BILIRUBINTOT 0.3   BILIRUBINDIR <0.1   TPRO 7.0   ALB 3.8   AST 13   ALT 10     Lab Results   Component Value Date/Time   Magnesium 2.3 06/07/2017 1427     Lab Results   Component Value Date/Time   Alcohol <0.01 06/07/2017 1427     Lab Results   Component Value Date/Time   Lipase 23 06/07/2017 1423     Pregnancy test: negative    ASSESSMENT/PLAN:    Patient Active Hospital Problem List:  Seizure ([facility]) (6/7/2017)  Assessment: Possible seizure. Her current symptoms and behavior are likely subjective, functional. She received IV fosphenytoin in the ER.  Plan: 1. Admit to neurology unit.  2. Seizure precautions.  3. Check lamotrigine level.  4. Continue current dose of lamotrigine.  5. Lorazepam prn seizures.  6. Consult neurology tomorrow.    Code status: full code  DVT  prophylaxis: none - low risk (0)        Procedure Notes  - in this encounter    Table of Contents for Procedure Notes  Lainey Gray MD - 06/10/2017 2:15 PM CDT  Lainey Gray MD - 06/10/2017 8:52 AM CDT  Yvonne Redman MD - 06/09/2017 9:26 AM CDT     Lainey Gray MD - 06/10/2017 2:15 PM CDT  EEG Report     Indication: Spells      Ordering Physician: Korin Coombs PA-C      EEG#:       Date of Service: 6/10/2017      Pertinent Medications:  Lamotrigine stopped after 9 am dose on 6/9/2017     Technical Description:  The continuous inpatient video EEG was recorded using VocalZoom digital EEG equipment. The electrodes were measured and applied according to the international 10-20 system.      Duration: Beginning at midnight on 6/10/2017 and ending at 1358 on 6/10/2017     State of patient: Awake, Drowsy and asleep     Activating Procedures: none         EEG description:     Interictal:  The awake background showed appropriate organization with anterior-posterior voltage and frequency gradients. There was a posterior dominant rhythm of 10 Hertz, which was symmetrical and showed normal reactivity. Anteriorly, there were expected pattern of lower voltage and more irregular mixed frequency rhythms.      Attenuation of the occipital rhythms accompanied drowsiness.      The sleep background was appropriately organized with well-developed vertex waves, spindles, and K-complexes. Slow wave sleep was also observed. These sleep transients showed appropriate morphology and were bilaterally synchronous and symmetrical.      Throughout the recording, there were no epileptiform discharges, paroxysmal features, focal features, or significant interhemispheric asymmetries.     Hyperventilation: N/A  Photic stimulation: N/A     Ictal: none     EKG: regular         EEG Impression:  This is a normal continuous inpatient video EEG.      There were no epileptiform abnormalities.      Report was given to  Dr. Ch and Dr. Ari Gray MD     Back to top of Procedure Notes  Lainey Gray MD - 06/10/2017 8:52 AM CDT  EEG Report    Indication: Spells     Ordering Physician: Korin Coombs PA-C     EEG#:      Date of Service: 6/9/2017     Pertinent Medications:  Lamotrigine stopped after 9 am dose on 6/9/2017    Technical Description:  The continuous inpatient video EEG was recorded using TrackDuck digital EEG equipment. The electrodes were measured and applied according to the international 10-20 system.     Duration: Beginning at midnight on 6/9/2017 and ending at midnight on 6/10/2017    State of patient: Awake, Drowsy and asleep    Activating Procedures: none     EEG description:    Interictal:  The awake background showed appropriate organization with anterior-posterior voltage and frequency gradients. There was a posterior dominant rhythm of 10 Hertz, which was symmetrical and showed normal reactivity. Anteriorly, there were expected pattern of lower voltage and more irregular mixed frequency rhythms.     Attenuation of the occipital rhythms accompanied drowsiness.     The sleep background was appropriately organized with well-developed vertex waves, spindles, and K-complexes. Slow wave sleep was also observed. These sleep transients showed appropriate morphology and were bilaterally synchronous and symmetrical.     Throughout the recording, there were no epileptiform discharges, paroxysmal features, focal features, or significant interhemispheric asymmetries.    Hyperventilation: N/A  Photic stimulation: N/A    Ictal: none    EKG: regular     EEG Impression:  This is a normal continuous inpatient video EEG.     There were no epileptiform abnormalities.     Report was given to Dr. Ch and Dr. Ari Gray MD         Back to top of Procedure Notes  Yvonne Redman MD - 06/09/2017 9:26 AM CDT  Formatting of this note may be different from the  original.  Electroencephalogram Procedure Note    Indications: Spells    Ordering Physician: Korin Coombs PA-C    EEG#:     Date: 06/08/2017    Start time: 12:20:03  End time: 00:00:00 (06//09/2017)    Medications    citalopram 30 mg Oral Daily     famotidine 20 mg Oral BID before meals     lamoTRIgine 100 mg Oral BID     sodium chloride 0.9% 10-60 mL Intravenous BID     Technical Description   This is a continuous video-EEG telemetry performed using 32-channel digital electroencephalographic recording equipment. International 10-20 electrode placement was used. The record was obtained with the patient awake and asleep. The record is of good technical quality for purposes of interpretation.    Activation Procedures: none  .  EEG Description  Awake: An organized background is observed. Posterior parietal occipital activity during wakefulness consists of 10 Hz medium voltage posterior parietal occipital activity, which waxes and wanes, and attenuates on eye opening bilaterally. Low voltage beta activity is observed with an anterior > posterior gradient.     Sleep: The predominant record is recorded in sleep capturing all sleep stages.With drowsiness, there is attenuation of the posterior dominant rhythm. As the patient enters into light sleep, vertex waves and symmetrical spindles are noted. K complexes are noted in sleep. Spindles and K complexes attenuate in slow wave sleep giving way to diffuse high voltage 1.5 Hz delta slowing. Transition to the waking state is unremarkable.     Result of Activation Procedures:  Hyperventilation: N/A.  Photic Stimulation: N/A.    Interictal: No interictal epileptiform discharges are recorded.    Ictal: No electrographic seizures are recorded.    EKG: normal sinus rhythm    Summary  Normal awake and sleep video-EEG telemetry.    Yvonne Spaulding MD        Back to top of Procedure Notes  Consult Notes  - in this encounter    Korin Coombs PA-C - 06/08/2017 12:00 PM  CDT  Associated Order(s): CONSULT NEUROLOGY        NAME: CADY APARICIO MR#: [id number]  CSN: [id number]  AUTHENTICATING CLINICIAN: LO Cantu  CONFIRM #: [id number]  LOC: 1    HOSPITAL CONSULTATION    DATE OF CONSULTATION: 2017  : 1995    REQUESTING PHYSICIAN:    REASON FOR CONSULTATION:  Seizure.    HISTORY:  Cady Aparicio is a 21-year-old female, with a history of neurocysticercosis, depression, anxiety, and possible seizures, who was admitted on 2017 after presenting with loss of consciousness and right-sided paralysis. History is obtained from the patient, as well as chart review, as she does not have a full recollection of yesterday's events.     Cady had an EEG done yesterday afternoon. When she returned home from this appointment, she was sitting in her car and turned back to check her daughter's seatbelt. She then blacked out, which she remembers, but then has no recollection of anything after that. She woke up and was slumped over in the  seat. Her mother called EMS, and she was brought to the emergency room. In the ER, she was very lethargic, but had a very fluctuating mental status. She immediately woke up when the ER physician was going to do a lumbar puncture. She became lethargic again, and then immediately woke up once given an acetaminophen suppository for her back pain, and started swearing at the staff. She apparently had another type of seizure activity while at CT. Her exam was inconsistent for the ER and admitting physicians.     Cady reports that on 2017, her right arm began twitching. It began twitching so much that she had to hold it down. She contacted her neurologist, Dr. Barron, and arrangements were made for an EEG, which she had yesterday, and was normal. Now following yesterday's events, her whole body is weak, but her right arm has had minimal to no movement. The twitching has overall stopped. She continues to complain of severe back pain.  "Libra was hospitalized in 04/2017, after presenting with left upper extremity and facial weakness. She was seen by a neurology consult during that hospital stay. Exam was notable for functional weakness and poor effort. Her symptoms were felt to be functional. Her bupropion was discontinued, and a psychiatry consult was recommended. No EEG was done at that time.     In regard to her prior seizure history, she developed a right upper extremity jerking sometime around 09/2016. EEG was normal. She was started on Keppra, but did not tolerate this due to changes with her mood. She was then switched to Lamictal, and she reports that the jerking stopped following initiation of Lamictal. She reports that the only seizures she has ever had was the occurrence in April 2017, as well as the two that occurred yesterday.     She reportedly has an extensive alcohol use history, though tells me she only has 1 drink on Friday or Saturday with dinner. However, this is different from what she told the admitting physician. She denies tobacco or drug use. She is undergoing stress with her job, and notes that she \"struggles with really bad depression.\" She does see Psychiatry.     She denies headache, fevers, photophobia, chills. She reports that she had meningitis as a child. No family history of seizures.     In regard to her neurocysticercosis, she is followed by Dr. Barron, of Neurology here. This was diagnosed in 04/2010 at [facility]. Her MRI had shown 2 foci of abnormal signal intensity in the cerebral hemispheres, with vesicular (right frontal) and calcified (left temporal) lesions. ID did not recommend treatment for this. Subsequent MRIs have been stable. Most recent MRI was 08/2016.    REVIEW OF SYSTEMS:  Positive for whole-body pain and weakness. She currently denies headaches. Right arm is the weakest.    PAST MEDICAL HISTORY:  1. Neurocysticercosis.  2. LGSIL.  3. Benign paroxysmal positional vertigo.  4. " Anxiety.  5. Depression.  6. Possible seizures.    SURGICAL HISTORY:  1. Cholecystectomy.  2. [place] teeth extraction.    SOCIAL HISTORY:  Reports that she only drinks 1 alcoholic beverage on Saturday with dinner. This report is quite variable from provider to provider. She denies tobacco or drug use. She works as a . She has a 5-year-old daughter.    OUTPATIENT MEDICATIONS:  1. Celexa 30 mg daily.  2. Lamotrigine (Lamictal) 100 mg b.i.d.  3. Zofran 4 mg q.8 hours p.r.n.  4. Trazodone 50 mg.    PHYSICAL EXAM:  VITALS: Blood pressure 90/53. Temperature 98.2. Pulse 86. Respirations 16. SpO2 96% on room air.   GENERAL: Lying in bed, appears calm and comfortable, until she has to move.   HEAD: NC/AT.   CARDIAC: Regular rate and rhythm.   LUNGS: Breathing unlabored.   EXTREMITIES: No edema.   SKIN: No rashes.   NEUROLOGIC:   Mental status: Alert and oriented to person, place, and time. Speech is clear without dysarthria. No aphasia. Follows commands appropriately.   Cranial nerves: Pupils equal. No nystagmus. Extraocular motility is intact in all directions. Visual fields are intact to direct confrontational testing in all quadrants. Facial sensation is intact bilaterally. Face is symmetric with symmetric smile.   Motor: Exam is quite variable. She initially tells me that she is unable to move her left arm, but then she is able to grab the bed rail to help reposition herself. When I check on her later, she is moving her left arm and hand. With a great deal of effort, she is able to lift up her left arm, it takes several seconds to raise it above her head. She has some giveaway weakness with strength testing of the deltoids. Also has variable effort with testing at the biceps and triceps, overall, her arm goes limp. Very weak  strength on the left. She initially states she has absolutely no movement of the right upper extremity, but she is seen with slight movement spontaneously at times. When I pick the arm  "up for her, it immediately falls to the bed. No  strength, no movement with strength testing. She is able to lift the left leg off the bed, but indicates extreme pain in the back area when doing so. She cannot cooperate with strength testing of the left leg. She does not move the right leg. When I move it for her, she has brief periods of resistance noted, but then the limb goes limp. Tone is normal. No myoclonus.   Sensory: Intact to light touch throughout. No neglect. Intact vibratory sensation and proprioception.   Reflexes: 2+ and symmetric in the biceps, triceps, brachioradialis, patellar, and ankle bilaterally. Plantar responses flexor bilaterally.    IMAGING:  Head CT 06/07/2017: No acute intracranial findings. As described on the prior MRI, there is a small, peripheral, cystic lesion in the right frontal lobe measuring 10 x 10 mm, smaller than on the MRI. This demonstrates a small central calcification, but is otherwise CSF attenuation. This was also present on the prior head CT.    ASSESSMENT:  Libra Lieberman is a 21-year-old female with a history of neurocysticercosis, depression, anxiety, and possible seizure disorder, who was admitted on 06/07/2017 after presenting with loss of consciousness and right-sided weakness. She recalls \"blacking out,\" but then has no recollection following that. She had been complaining of right upper extremity twitching with onset 06/05/2017, and had an EEG just prior to this episode, which was normal. Following the blackout episode, she has right-sided diffuse weakness, as well as weakness on the left side, though exam is inconsistent. Level of alertness was quite variable in the ER, as well. Exam is currently limited due to pain, there are also some functional elements continued to be noted. A functional disorder has been suggested in the past, notably during a recent hospital admission in 04/2017 for similar-type episodes. She has an extensive psychiatric history. " "However, given her history of neurocysticercosis, she does have valid source for seizures. Also to consider are combination of pseudoseizures and seizures.    DIAGNOSES:  1. Spells: seizure versus pseudoseizure, possibly both.  2. Depression.  3. Anxiety.  4. Neurocysticercosis.    RECOMMENDATIONS:  1. This is now her second hospitalization in just a 2-month time period for these types of spells. Functional components have been noted both times; however, with her neurocysticercosis, she would have some concern for seizures due to cortical irritation. Therefore, we will start continuous video EEG monitoring in hopes of sorting out if these are seizures versus pseudoseizures.  2. If she does not have any events overnight, we could consider discontinuing her Lamictal. Will update recommendations regarding this tomorrow.  3. Lamictal level is pending.  4. Do not feel additional imaging is needed at this time.  5. Physical therapy will be ordered.  6. Seizure precautions.  7. Continue frequent vitals and neuro checks.     This patient was discussed with her outpatient neurologist, Dr. Tonya Barron, who is in agreement with the above plan. Also discussed with attending physician, Dr. Henrry Ch.    Korin Coombs PA-C  Neurology    RUDY:MEDBENSON C:   D:06/08/17 09:27 T: 06/08/17 10:30 CONFIRM #: [id number]      Miscellaneous Notes  - in this encounter    Table of Contents for Miscellaneous Notes  ED Notes - Mitzy Escalatne RN - 06/07/2017 3:25 PM CDT  Triage Assessment Note - Wendy Haynes RN - 06/07/2017 2:22 PM CDT  ED Provider Notes - Aj Suarez DO - 06/07/2017 1:57 PM CDT  ED Notes - Arlette Barnard [facility] - 06/07/2017 1:41 PM CDT     ED Notes - Mitzy Escalante RN - 06/07/2017 3:25 PM CDT  When pt was notified that she will be receiving tylenol rectally for her back pain, pt's eyes opened wide, and she loudly stated \"You ain't putting anything up my ass! Fuck no! Fuck no! I can swallow pills, I swear.\" Pt was " informed of the reasoning for the precautions regarding possible inability to swallow liquids properly due to per prior paralysis. MD notified.    Back to top of Miscellaneous Notes  Triage Assessment Note - Wendy Haynes RN - 06/07/2017 2:22 PM CDT  As reported by patient: Patient was getting home from an appointment here getting a follow up EEG, when she pulled the car up to home, had a seizure, that was only witnessed by her 5 year old. Her mother came out to the yard to water plants about 20 min later and found her slumped over in car. Mother called 911. Patient presented here with total paralysis on right side with right facial droop and weakness on left side. Patient reported normal sensation on right leg but not right arm. Patient's speech was clear and she was oriented x 3, giving a good history and details of event. Upon asking questions patient became more sleepy and eventually unable to answer questions or open eyes. Patient taken immediately to CT where patient had a short 20 second? Seizure on table. Patient now back in room. Family with patient.     Back to top of Miscellaneous Notes  ED Provider Notes - Aj Suarez DO - 06/07/2017 1:57 PM CDT  Formatting of this note may be different from the original.  Chief Complaint:  Seizures    HPI:   Libra Lieberman is a 21 y.o. female with a history of seizures who presents to the emergency center via EMS for evaluation of a seizure and right sided paralysis. Upon arrival, patient was able to provide history to a healthcare provider. The patient went to get a follow up EEG this afternoon at 12:10, and once she arrived to her house, she had a seizure in the car. Her mother came over about 20 minutes later to water flowers and found the patient slumped over in her car. Mother called EMS; therefore she presents here. Patient also reported back pain and weakness on the right side. Of note- from 4/21 to 4/23 the patient was admitted to the hospital for a  seizure with left sided weakness    Medications:  citalopram (CELEXA) 20 MG tablet  lamoTRIgine (LAMICTAL) 100 MG tablet  ondansetron (ZOFRAN) 4 MG tablet  traZODone (DESYREL) 50 MG tablet    Allergies:  Keppra    Past Medical History:   Neurocysticercosis  Urinary tract infection  Varicella  Contraceptive management  Vertigo  Cyst of brain  Anxiety  Benign paroxysmal positional vertigo  Nondependent cocaine abuse, episodic  Neurocysticercosis  Low grade squamous intraepithelial lesion on cervicovaginal cytologic smear   Acquired absence of other organs  Left side weakness  Concussion    Past Surgical History:  Cholecystectomy  [place] teeth extraction    Family History:  Hyperlipidemia  Hypertension  Diabetes    Social History:  Patient is single, a former smoker, and consumes 2-3 alcoholic beverages per week. She has one child.    Review of Systems   Constitutional: Negative for chills and fever.   Musculoskeletal: Positive for back pain.   Neurological: Positive for seizures and numbness.   Paralysis (+)   All other systems reviewed and are negative.    Triage Vitals   Temp 06/07/17 1400 37.2  C (99  F)   Temp src 06/07/17 1400 Oral   Pulse 06/07/17 1424 93   Resp --   BP 06/07/17 1424 108/66   SpO2 06/07/17 1424 98 %     Physical Exam  Constitutional: Lying in bed, appears sedated. Answering questions, but very drowsy. Poor historian.  HENT:   Mouth/Throat: Oropharynx is clear and moist and mucous membranes are normal. No posterior oropharyngeal edema.   Eyes: EOM are normal. Pupils are equal, round, and reactive to light.   Neck: Neck supple.   Cardiovascular: Normal rate, regular rhythm and normal heart sounds.   Pulmonary/Chest: Breath sounds clear without wheezing, rhonchi or rales.   Abdominal: Soft. There is no tenderness. There is no rebound and no guarding.   Musculoskeletal: Normal range of motion.   Neurological: Patient has diffuse weakness, greater on right than the left. Unable to fully squeeze her  hands to make a fist bilaterally. Unable to lift her legs above table bilaterally. No gross focal neurologic deficits.   Skin: Skin is warm and dry.   Psychiatric: Normal mood and affect.     Procedures:  Lumbar Puncture  Indications: Headache, back pain  Consent: Risks (including, but not limited to infection, bleeding, spinal headache with possibility of spinal patch, and temporary or permanent neurologic injury) and benefits, as well as alternatives were discussed with the the patient, and consent for the procedure was obtained.  Timeout: Universal protocol was followed. Timeout conducted just prior to starting the procedure confirmed the patient s identity, procedure, site/side, patient position, and availability of correct equipment and implants.  Medication: Lorazepam IV  Procedure: Patient was placed in a left lateral decubitus position. The skin overlying the L4-5 area was prepped with Shur-Clens. The patient was medicated as above. A spinal needle was used to attempt to gain access to the subarachnoid space with a stylet in place. The patient tolerated the procedure poorly and was not able to position well due to her back pain. CSF was not obtained and the patient requested the procedure be stopped, it was discontinued. The needle was removed. There were no complications.    ECG:  Indication: Seizure  Time: 14:26  Interpretation: Normal sinus rhythm  Nonspecific T wave abnormality  Abnormal ECG  When compared with ECG of 22-AUG-2016 19:02,  Nonspecific T wave abnormality, worse in Anterior leads  Rate: 97   R-Axis: 44    Imaging:  CT Head without IV contrast: No acute intracranial findings. Results per Radiology (please see formal Radiology report for further details).     CT Abdomen & Pelvis with IV contrast: No acute findings in the abdomen or pelvis. Mild focal fatty infiltration in the left lobe of the liver. Trace free fluid in the pelvis is nonspecific but likely physiologic. Results per radiology  (please see formal Radiology report for further details).     Laboratory:  Basic Metabolic Panel: Cr. 0.71 (WNL), glucose 103 (H) o/w WNL  CBC with Differential: WBC 8.9 (WNL), Hgb 13.0 (WNL), Plts 230 (WNL) o/w WNL  Pregnancy test screen blood: NEG  ETOH Level: <0.01 (WNL)  Anion gap: 9 (WNL)  Magnesium: 2.3 (WNL)  Lamictal: pending  Protime: 13.0 (WNL); INR 1.0  Hepatic Function Panel: All WNL  Lipase: 23 (WNL)  Ehrlichia/Anaplasma smear: pending  Lyme disease screen: pending  Urine drugs of abuse screen: All NEG  Urinalysis: cloudy urine with Occ bacteria and moderate amorphous crystals o/w NEG  MRSA screen culture: pending    ED Course:  Interventions:  (1400) Tylenol 650 mg, rectal   (1438) Ativan 0.5 mg, IV  (1445) Cerebyx 1,500 mg, IV  (1820) Tylenol 650 mg, PO  (1910) Flexeril 10 mg, PO     Patient placed on continuous pulse oximetry. Past medical records were reviewed and I examined the patient. IV access was established and above labs, imaging, and EKG were ordered and interventions administered.    1427 spoke with Dr. Henrry Ch from neurology.    1630 I consulted with Dr. Josesito Menjivar about the best course of treatment for the patient.     I discussed findings and plan for admission with the patient, who is agreeable. I discussed the patient with the hospitalist, who agrees to admit. Patient is admitted to the hospital in stable condition for further evaluation and treatment.    Last EC Vitals:  Temp: 37.2  C (99  F) (06/07 1400)  Temp src: Oral (06/07 1400)  Pulse: 97 (06/07 1653)  Resp: --  BP: 107/55 (06/07 1631)  SpO2: 97 % (06/07 1653)    Medical Decision Making:  Libra Lieberman is a 21 y.o. female with a history of seizures, and neurocysticercosis but otherwise healthy that presents with reported seizure-like activity. The patient reportedly had her first seizure in April of this year. Please see the admission and discharge summary from that for further details. She had some left-sided  weakness and eventually status improved, and she was discharged home. She had initially been seen at [facility] and then was transferred here. She had been doing well, had an outpatient EEG today, and afterwards was driving home and the minute she got home, she reportedly had a seizure in the car. She was then transferred here. When I saw the patient, the patient is extremely sedated and can barely open her eyes partially and cannot squeeze my fingers or lift her arms or legs off the bed. Reportedly per the nurses, initially she could do these things and was talking fairly normally, but then became very sedated like this. She seemed to have a little more weakness in the right than the left, but it was still quite diffuse. I did send her for a CT promptly. It did not show anything acute.    Since being here, she has had periods where she will become much more alert. Then she can talk fairly normally although still feels weak like she could not stand up and walk. Weakness now again seems diffuse and non focal, but now she is able to squeeze my fingers. She is now also complaining of back pain. She has also complained of a headache for the past week. She has not had any fevers.     CT of the head and basic labs were all essentially unremarkable. Because of the back pain, I wanted to do an xray, but as she could not stand up, I could not get regular xrays, so a CT of the abdomen and pelvis was performed. No signs of any spinal injury from the seizures. No fractures. She also has no other intra abdominal process.    I also considered an infectious etiology, but she has no fevers or abnormal white blood cell count. As initially, I still was not having good answers. Since she had a headache and back pain, I did attempt a lumbar puncture. When I did this procedure, she was then becoming much more alert and would not hold in good position where I could obtain CSF. Procedure was stopped then per her request.      Patient has intermittent episodes where she is more alert than not. She also does respond to pain quite well. I question that there could be a psychiatric component to this. Alcohol or substance abuse could be a contributing factor also. Regardless, I do not see anything else going on at this time. She will be admitted for further neurologic evaluation. I did also discuss this with Dr. Ch from Neurology at the beginning of this case. She did have a brief seizure in CT and the other seizure, this could be considered status epilepticus. She was therefore given Ativan and Cerebyx as above.     Diagnosis:  Final diagnoses:   [G40.901] Status epilepticus (HRC)   [R53.1] Generalized weakness   [R56.9] Seizure (HRC)   [M54.5] Acute bilateral low back pain without sciatica (HRC)     I, Lucia Roy, am serving as a scribe to document services personally performed by Dr. Aj Suarez based on my observations and the provider's statements to me.  6/7/2017  [facility]      Aj Suarez DO  06/07/17 2201      Back to top of Miscellaneous Notes  ED Notes - Arlette Barnard, ASHLEIGH - 06/07/2017 1:41 PM CDT  Bed: C13  Expected date: 6/7/17  Expected time: 1:15 PM  Means of arrival: Ambulance  Comments:  Rig 711 21yr f paralysis after seizure

## 2017-06-15 NOTE — IP AVS SNAPSHOT
MRN:[id number]                      After Visit Summary   6/15/2017    Libra Lieberman    MRN: [id number]           Thank you!     Thank you for choosing [facility] for your care. Our goal is always to provide you with excellent care. Hearing back from our patients is one way we can continue to improve our services. Please take a few minutes to complete the written survey that you may receive in the mail after you visit with us. Thank you!        Patient Information     Date Of Birth          1995        Designated Caregiver       Most Recent Value    Caregiver    Will someone help with your care after discharge? no      About your hospital stay     You were admitted on:  Lisa 15, 2017 You last received care in the:  [address] [facility]    You were discharged on:  June 16, 2017        Reason for your hospital stay       You were in the hospital for evaluation of your weakness/concerns for seizures. You were hooked up to an EEG while in the hospital and no seizures were found.                  Who to Call     For medical emergencies, please call 911.  For non-urgent questions about your medical care, please call your primary care provider or clinic, [phone]          Attending Provider     Provider Specialty    Eleazar Clay MD Emergency Medicine    [facility], Roly Bush MD Neurology       Primary Care Provider Office Phone # Fax #    Dinah Smith [phone]      After Care Instructions     Activity       Your activity upon discharge: activity as tolerated and no driving for 3 months per [place] state law given your concern for spells. You should also refrain from baths, swimming alone, or any other activity where a sudden loss of consciousness could result in bodily harm. You will need to contact the DMV to report this new status.            Diet       Follow this diet upon discharge: Orders Placed This Encounter      Combination Diet Regular Diet Adult                   Follow-up Appointments     Adult [facility] Follow-up and recommended labs and tests       Follow up with primary care provider, Dinah Smith, within 7 days for hospital follow- up.  No follow up labs or test are needed.    Follow up with Dr. Barron, your primary neurologist at your regular neurology clinic. You indicated you already have an appointment in late June 2017. Please keep this appointment for your ongoing evaluation and care. No follow up labs or test are needed.    Appointments on [place] and/or [facility] (with [facility] or [facility] provider or service). Call [phone] if you haven't heard regarding these appointments within 7 days of discharge.                  Additional Services     NEUROPSYCHOLOGY REFERRAL       Your provider has referred you to:    [facility]: Adult Neuropsychology Clinic (Mental Health Services) - [place] [phone]   [email or web address]    All scheduling is subject to the client's specific insurance plan & benefits, provider/location availability, and provider clinical specialities.  Please arrive 15 minutes early for your first appointment and bring your completed paperwork.    Please be aware that coverage of these services is subject to the terms and limitations of your health insurance plan.  Call member services at your health plan with any benefit or coverage questions.    Please bring the following to your appointment:  >>   Any x-rays, CTs or MRIs which have been performed.  Contact the facility where they were done to arrange for  prior to your scheduled appointment.  Any new CT, MRI or other procedures ordered by your specialist must be performed at a [facility] facility or coordinated by your clinic's referral office.    >>   List of current medications   >>   This referral request   >>   Any documents/labs given to you for this referral                  Pending Results     Date and Time Order Name Status  "Description    6/15/2017 1401 Lamotrigine Level In process             Statement of Approval     Ordered          06/16/17 1200  I have reviewed and agree with all the recommendations and orders detailed in this document.  EFFECTIVE NOW     Approved and electronically signed by:  Roly Frazier MD             Admission Information     Date & Time Provider Department Dept. Phone    6/15/2017 Roly Frazier MD [address] [facility] [phone]      Your Vitals Were     Blood Pressure Pulse Temperature Respirations Height Weight    90/55 (BP Location: Left arm) 82 97.2  F (36.2  C) (Oral) 16 1.549 m (5' 1\") 76.6 kg (168 lb 14 oz)    Last Period Pulse Oximetry BMI (Body Mass Index)             05/19/2017 (Exact Date) 95% 31.91 kg/m2         MyChart Information     [employer] gives you secure access to your electronic health record. If you see a primary care provider, you can also send messages to your care team and make appointments. If you have questions, please call your primary care clinic.  If you do not have a primary care provider, please call [phone] and they will assist you.        Care EveryWhere ID     This is your Care EveryWhere ID. This could be used by other organizations to access your [facility] medical records  [id number]           Review of your medicines      CONTINUE these medicines which have NOT CHANGED        Dose / Directions    citalopram 20 MG tablet   Commonly known as:  celeXA        Take 1 and 1/2 tablets (30 mg) by mouth daily   Refills:  0       lamoTRIgine 100 MG tablet   Commonly known as:  LaMICtal        Dose:  100 mg   Take 100 mg by mouth 2 times daily   Refills:  0                Protect others around you: Learn how to safely use, store and throw away your medicines at www.disposemymeds.org.             Medication List: This is a list of all your medications and when to take them. Check marks below indicate your daily home schedule. Keep this list as a reference.    "   Medications           Morning Afternoon Evening Bedtime As Needed    citalopram 20 MG tablet   Commonly known as:  celeXA   Take 1 and 1/2 tablets (30 mg) by mouth daily   Last time this was given:  30 mg on 6/16/2017 12:20 PM                                lamoTRIgine 100 MG tablet   Commonly known as:  LaMICtal   Take 100 mg by mouth 2 times daily   Last time this was given:  100 mg on 6/16/2017  8:12 AM

## 2017-06-16 ENCOUNTER — ALLIED HEALTH/NURSE VISIT (OUTPATIENT)
Dept: NEUROLOGY | Facility: CLINIC | Age: 22
DRG: 101 | End: 2017-06-16
Attending: PSYCHIATRY & NEUROLOGY
Payer: COMMERCIAL

## 2017-06-16 VITALS
HEIGHT: 61 IN | HEART RATE: 82 BPM | DIASTOLIC BLOOD PRESSURE: 55 MMHG | WEIGHT: 168.87 LBS | RESPIRATION RATE: 16 BRPM | SYSTOLIC BLOOD PRESSURE: 90 MMHG | OXYGEN SATURATION: 95 % | TEMPERATURE: 97.2 F | BODY MASS INDEX: 31.88 KG/M2

## 2017-06-16 DIAGNOSIS — R56.9 SEIZURE (H): Primary | ICD-10-CM

## 2017-06-16 LAB
ANION GAP SERPL CALCULATED.3IONS-SCNC: 4 MMOL/L (ref 3–14)
BUN SERPL-MCNC: 12 MG/DL (ref 7–30)
CALCIUM SERPL-MCNC: 8.6 MG/DL (ref 8.5–10.1)
CHLORIDE SERPL-SCNC: 108 MMOL/L (ref 94–109)
CO2 SERPL-SCNC: 29 MMOL/L (ref 20–32)
CREAT SERPL-MCNC: 0.72 MG/DL (ref 0.52–1.04)
ERYTHROCYTE [DISTWIDTH] IN BLOOD BY AUTOMATED COUNT: 12.9 % (ref 10–15)
GFR SERPL CREATININE-BSD FRML MDRD: NORMAL ML/MIN/1.7M2
GLUCOSE SERPL-MCNC: 81 MG/DL (ref 70–99)
HCT VFR BLD AUTO: 39.4 % (ref 35–47)
HGB BLD-MCNC: 13 G/DL (ref 11.7–15.7)
MCH RBC QN AUTO: 29.7 PG (ref 26.5–33)
MCHC RBC AUTO-ENTMCNC: 33 G/DL (ref 31.5–36.5)
MCV RBC AUTO: 90 FL (ref 78–100)
PLATELET # BLD AUTO: 181 10E9/L (ref 150–450)
POTASSIUM SERPL-SCNC: 4.1 MMOL/L (ref 3.4–5.3)
RBC # BLD AUTO: 4.37 10E12/L (ref 3.8–5.2)
SODIUM SERPL-SCNC: 140 MMOL/L (ref 133–144)
WBC # BLD AUTO: 5.3 10E9/L (ref 4–11)

## 2017-06-16 PROCEDURE — 85027 COMPLETE CBC AUTOMATED: CPT | Performed by: EMERGENCY MEDICINE

## 2017-06-16 PROCEDURE — 25000132 ZZH RX MED GY IP 250 OP 250 PS 637: Performed by: PSYCHIATRY & NEUROLOGY

## 2017-06-16 PROCEDURE — 36415 COLL VENOUS BLD VENIPUNCTURE: CPT | Performed by: EMERGENCY MEDICINE

## 2017-06-16 PROCEDURE — 80048 BASIC METABOLIC PNL TOTAL CA: CPT | Performed by: EMERGENCY MEDICINE

## 2017-06-16 PROCEDURE — 95951 ZZHC EEG VIDEO EACH 24 HR: CPT | Mod: ZF

## 2017-06-16 RX ADMIN — CITALOPRAM HYDROBROMIDE 30 MG: 20 TABLET ORAL at 12:20

## 2017-06-16 RX ADMIN — LAMOTRIGINE 100 MG: 25 TABLET ORAL at 08:12

## 2017-06-16 ASSESSMENT — VISUAL ACUITY
OU: BLURRED VISION

## 2017-06-16 ASSESSMENT — PAIN DESCRIPTION - DESCRIPTORS: DESCRIPTORS: HEADACHE

## 2017-06-16 NOTE — PROGRESS NOTES
Pre - [facility] EEG report on 6/15/2017 1st 20 minutes .   Full report to follow.       This EEG is normal. There is a 8-9 hz symmetric parieto-occipital rhythm. No epileptiform discharges or electrographic seizures were seen in this record. If events of interest are noted, please press the event button. Clinical correlation is advised.     Tiarra Gray MD  Staff Epilepsy Neurologist   [phone]

## 2017-06-16 NOTE — PROGRESS NOTES
Brief Neurology Note  Patient reporting multiple episodes of disorientation. One time not knowing date. Another not knowing where she is.     Then around 2300 began complaining of headache. 30 mg Toradol with no effect. Says she is not a headache person and this is a very severe headache. Headache is occipital, achy, associated w/ photo/phonophobia and nausea. No radiation.     Treated with 10 mg Compazine + 25 mg Benadryl IV as per new RCT that showed superiority in acute headache treatment when compared to 1mg Dilaudid. Re-evaluated 20 minutes after dose given, patient reported resolution of headache. Exam unchanged. Patient resting comfortably when seen later. Will continue to monitor.    Francisco Meeks, DO  Neurology PGY3

## 2017-06-16 NOTE — DISCHARGE SUMMARY
"[facility]  NEUROLOGY DISCHARGE SUMMARY    Patient Name:  Libra Lieberman  MRN:  [id number]      :  1995      Date of Admission:  6/15/2017  Date of Discharge:  2017  Admitting Physician:  Roly Fraizer MD  Discharge Physician:  Roly Frazier MD  Primary Care Provider:   Dinah Smith  Discharge Disposition:   Discharged to home    Admission Diagnoses:  Convulsion (H) [R56.9]    Discharge Diagnoses:    Spells    Brief History of Illness:   21 year old female h/o neurocysticercosis who initially presented with multiple symptoms including blurred vision and shifting weakness. Initial concern for stroke though CT head was negative. Immediate concern was also for seizures and so she was admitted for monitoring.    Please see H&P dated 6/15/17 for more information regarding this patient's initial presentation.    Hospital Course:  Patient admitted for seizure workup given the above presenting complaints. Exam initially showed some shifting weakness but improved. NIH 2 due to mild drift that later improved. Patient herself improved overnight and did not have new complaints on hospital day 2. EEG did not show any seizure activity despite some events noted by the patient overnight. MRI was not pursued as she had one 2 months prior to this admission that showed stable, chronic neurocysticercosis but no other structural deformity. We do note that images from that MRI were not available, only the radiologist's interpretation and summary. PTA lamictal and citalopram were continued during her hospitalization. No medication changes at discharge. She is to follow-up with her primary neurologist, Dr. Barron for these spells and next steps in ongoing evaluation and care. Of note, we did discuss that patient should not drive for the next 3 months given her \"spell\" and that she should further refrain from baths, swimming alone, or any other activity where sudden loss of " "consciousness could result in bodily harm. She registered her understanding and agreement with this. She was further informed she would need to contact the DMV to inform them of this status.     Pertinent Investigations:  1. EEG:  This EEG is normal. There is a 8-9 hz symmetric parieto-occipital rhythm. No epileptiform discharges or electrographic seizures were seen in this record. If events of interest are noted, please press the event button. Clinical correlation is advised.      Tiarra Gray MD  Staff Epilepsy Neurologist   [phone]     Consultations:    N/A    Discharge physical examination:   BP 90/55 (BP Location: Left arm)  Pulse 82  Temp 97.2  F (36.2  C) (Oral)  Resp 16  Ht 1.549 m (5' 1\")  Wt 76.6 kg (168 lb 14 oz)  LMP 05/19/2017 (Exact Date)  SpO2 95%  Breastfeeding? No  BMI 31.91 kg/m2  General: Alert, NAD  HEENT: Atraumatic  Cardiac: RRR  Pulm: CTAB  Abdomen: Soft, non-tender  Extremities: No edema  Skin: Warm, dry  Psych: Pleasant affect  Neuro:  -MS: Alert, awake, following commands. Naming, registration, recall intact.  -CN: PERRL, EOMI. Face symmetric. Tongue protrusion midline  -Motor: 4+/5 strength diffusely, large component of giveaway weakness  -Sensory: intact to LT, PP in all extremities  -Reflexes: 2+ and symmetric  -Coordination: FNF, HS intact  -Gait: deferred    Discharge Medications:  Current Discharge Medication List      CONTINUE these medications which have NOT CHANGED    Details   lamoTRIgine (LAMICTAL) 100 MG tablet Take 100 mg by mouth 2 times daily      citalopram (CELEXA) 20 MG tablet Take 1 and 1/2 tablets (30 mg) by mouth daily             Discharge follow up and instructions:    NEUROPSYCHOLOGY REFERRAL     Reason for your hospital stay   You were in the hospital for evaluation of your weakness/concerns for seizures. You were hooked up to an EEG while in the hospital and no seizures were found.     Adult [facility] Follow-up and recommended labs and tests   Follow up " with primary care provider, Dinah Smith, within 7 days for hospital follow- up.  No follow up labs or test are needed.    Follow up with Dr. Barron, your primary neurologist at your regular neurology clinic. You indicated you already have an appointment in late June 2017. Please keep this appointment for your ongoing evaluation and care. No follow up labs or test are needed.    Appointments on [place] and/or [facility] (with [facility] or [facility] provider or service). Call [phone] if you haven't heard regarding these appointments within 7 days of discharge.     Activity   Your activity upon discharge: activity as tolerated and no driving for 3 months per [place] state law given your concern for spells. You should also refrain from baths, swimming alone, or any other activity where a sudden loss of consciousness could result in bodily harm. You will need to contact the DMV to report this new status.     Full Code     Diet   Follow this diet upon discharge: Orders Placed This Encounter     Combination Diet Regular Diet Adult         Patient seen and discussed with Dr. Zan Rajan-Rafat Head MD  Neurology PGY2  Pgr: [id number]    PATIENT SEEN AND EXAMINED BY ME on June 16, 2017 I AGREE WITH THE FINDINGS DETAILED BY THE NEUROLOGY RESIDENT and documented in their note on June 16, 2017   PLEASE REFER TO THEIR NOTE FOR ADDITIONAL DETAILS.     DIAGNOSIS neurocysticercosis. Spells appear to be nonepileptic in nature.   MANAGEMENT discharge planning less than 30 minutes. Patient stable for discharge on medications. She will followup with Dr. Barron whom she already has an appointment.     ZAN FRANCO MD

## 2017-06-16 NOTE — PLAN OF CARE
Problem: Goal Outcome Summary  Goal: Goal Outcome Summary  Outcome: Adequate for Discharge Date Met:  06/16/17  A&Ox4. VSS on RA ex. Baseline soft bps. Pt moves independently voids and stools on own. Pt's appetite is good. All neuros intact ex. Reports of intermittent blurry vision. EEG d/c'ed. Pt adequate for discharge. Reviewed discharge papers with pt and Pt verbalized full understanding. Plan is to d/c to home, sister to come and transport pt. Pt belongings from security being sent up. Pt safely off the unit.

## 2017-06-16 NOTE — PLAN OF CARE
"Problem: Goal Outcome Summary  Goal: Goal Outcome Summary  BP (!) 81/51 (BP Location: Left arm)  Pulse 82  Temp 96.5  F (35.8  C) (Oral)  Resp 16  Ht 1.549 m (5' 1\")  Wt 76.6 kg (168 lb 14 oz)  LMP 05/19/2017 (Exact Date)  SpO2 97%  Breastfeeding? No  BMI 31.91 kg/m2      A&Ox4, AVSS w/ hypotension; MD notified. Neuros unchanged: RUE 2/5 with numbness/tingling, LUE 5/5, BLE 5/5; severe headache. VEEG leads in place; no events witnessed or reported. Seizure precautions maintained. Compazine and Benadryl given for headache per MAR; pt stated significant decrease in pain; rested well for most of shift. Up w/ assist of 1 + GB. Regular diet. UA needs to be collected. Will continue to monitor and follow POC.      "

## 2017-06-16 NOTE — PLAN OF CARE
Problem: Goal Outcome Summary  Goal: Goal Outcome Summary  Outcome: No Change  Pt admitted from ED for evaluation of spells. VSS. A&O x4; forgetful at times and MD aware. Neuro unchanged: RUE 2/5 with N/t, LUE 5/5, BLE 5/5. VEEG leads in place; no events witnessed or reported. Seizure precautions maintained. Up with 1 and GB. UA needs to be collected. Regular diet; good intake. Pt c/o HA; Tylenol given x1 without relief; MD aware. Continue to monitor and follow current POC.     2300: Ketoralac given without HA relief, MD notified and saw pt, ordered compazine and benadryl IV. Continue to monitor and notify MD if HA changed/relief with compazine and benadryl.

## 2017-06-16 NOTE — MR AVS SNAPSHOT
After Visit Summary   6/16/2017    Libra Lieberman    MRN: [id number]           Patient Information     Date Of Birth          1995        Visit Information        Provider Department      6/16/2017 7:00 AM [facility] EEG TECH 4 [facility] EEG        Today's Diagnoses     Seizure (H)    -  1       Follow-ups after your visit        Who to contact     Please call your clinic at [phone] to:    Ask questions about your health    Make or cancel appointments    Discuss your medicines    Learn about your test results    Speak to your doctor   If you have compliments or concerns about an experience at your clinic, or if you wish to file a complaint, please contact [facility] Patient Relations at [phone] or email us at [email or web address]         Additional Information About Your Visit        MyChart Information     [employer] gives you secure access to your electronic health record. If you see a primary care provider, you can also send messages to your care team and make appointments. If you have questions, please call your primary care clinic.  If you do not have a primary care provider, please call [phone] and they will assist you.      [employer] is an electronic gateway that provides easy, online access to your medical records. With [employer], you can request a clinic appointment, read your test results, renew a prescription or communicate with your care team.     To access your existing account, please contact your [facility] or call [phone] for assistance.        Care EveryWhere ID     This is your Care EveryWhere ID. This could be used by other organizations to access your [facility] medical records  [id number]        Your Vitals Were     Last Period                   05/19/2017 (Exact Date)            Blood Pressure from Last 3 Encounters:   06/16/17 90/55   12/26/16 108/63   06/02/16 102/71    Weight from Last 3 Encounters:    06/15/17 76.6 kg (168 lb 14 oz)   12/26/16 82.7 kg (182 lb 6 oz)   06/02/16 83.5 kg (184 lb)              Today, you had the following     No orders found for display         Today's Medication Changes      Notice     This visit is during an admission. Changes to the med list made in this visit will be reflected in the After Visit Summary of the admission.             Primary Care Provider Office Phone # Fax #    Dinah Smith [phone]       [place] [id number] [facility] [id number]        Thank you!     Thank you for choosing [facility]  for your care. Our goal is always to provide you with excellent care. Hearing back from our patients is one way we can continue to improve our services. Please take a few minutes to complete the written survey that you may receive in the mail after your visit with us. Thank you!             Your Updated Medication List - Protect others around you: Learn how to safely use, store and throw away your medicines at www.disposemymeds.org.      Notice     This visit is during an admission. Changes to the med list made in this visit will be reflected in the After Visit Summary of the admission.

## 2017-06-17 LAB — LAMOTRIGINE SERPL-MCNC: 3 UG/ML

## 2017-06-17 NOTE — PROCEDURES
EEG#:  -1.       VIDEO EEG DAY 1 on 06/15/2017.      SOURCE FILE DURATION:  4 hours and 4 minutes.      PATIENT INFORMATION:  Cady Lieberman is a 21-year-old female with neurocysticercosis presented with vision changes.  EEG is being done to evaluate for seizures.  Patient is on Lamictal and Benadryl.      TECHNICAL SUMMARY: This continuous video- EEG monitoring procedure was performed with 23 scalp electrodes in 10-20 electrode system placements, and additional scalp, precordial and other surface electrodes used for electrical referencing and artifact detection.  Video monitoring was utilized and periodically reviewed by EEG technologists and the physician for electroclinical correlations.    BACKGROUND:  Patient has an 8 Hz parieto-occipital rhythm that is symmetric and reactive attenuates with eye opening and is reactive with eye closure.  She did have stage I sleep as demarcated by reduction in alpha rhythm in the posterior quadrant roving eye movements and bursts of intermittent generalized theta slowing and drowsiness. She did not reach stage II or other further stages of sleep.      ACTIVATION PROCEDURE:  Photic stim hyperventilation not done.      EPILEPTIFORM DISCHARGES:  None.      ICTAL:  None.      IMPRESSION:  Video EEG day 1  awake and drowsy is normal.  No electrographic seizures, epileptiform discharges or paroxysmal behavioral spells were seen.           CHARANJIT PERKINS MD             D: 2017 16:10   T: 2017 22:52   MT: LEO      Name:     CADY LIEBERMAN   MRN:      [id number]        Account:        [id number]   :      1995           Procedure Date: 06/15/2017      Document: [id number]

## 2017-06-18 NOTE — PROCEDURES
EEG #:  -1       VIDEO EEG DAY 2      DATE OF RECORDIN2017        SOURCE FILE DURATION:  12 hours 25 minutes.      PATIENT INFORMATION:  Libra Lieberman is a 21-year-old female with neurocysticercosis presents with multiple symptoms including blurred vision, shifting weakness.  EEG is being done to evaluate for seizures.      MEDICATION Lamictal, Benadryl.     TECHNICAL SUMMARY: This continuous video- EEG monitoring procedure was performed with 23 scalp electrodes in 10-20 electrode system placements, and additional scalp, precordial and other surface electrodes used for electrical referencing and artifact detection.  Video monitoring was utilized and periodically reviewed by EEG technologists and the physician for electroclinical correlations.     BACKGROUND:  The patient in the awake state has a well-formed parietooccipital rhythm of 8-9 Hz which is symmetric and reactive, sinusoidal attenuates with eye opening and is reactive with eye closing.  Frontal derivations of symmetric beta activity.  In sleep, she has well-formed sleep architecture consisting of sleep spindles, vertex waves, K complexes and POSTs.      ACTIVATION PROCEDURES:  Photic stimulation and hyperventilation was done, no significant abnormalities were identified.  Patient had an event after photic stimulation.        EPILEPTIFORM DISCHARGES:  None.        EVENTS:  After photic stimulation the patient did complain of blurry vision or lost vision, and patient was asked to identify how many fingers staff was holding up and she was not able to.  Several minutes later, the patient stated her vision was getting worse.  During the event, the patient was given a cue word and later on she could not recall the cue word.  She was asked point to the ceiling and she was able to point to the ceiling.  She was shown an object, she was able to identify the object. EEG during this time was normal.  She had a maintained parietooccipital rhythm with  no significant change from her baseline, more so there was no seizure pattern identified.      IMPRESSION VIDEO EEG DAY 2:  Awake and sleep video EEG is normal.  The patient had 1 event where she stated she lost her vision and this lasted several minutes.  The patient's behavioral testing at that time was completed.  The patient was able to participate appropriately except when she was given a cue word, she was not able to recall cue at a later time.  Aside from this, patient had no other clinical manifestations identified on the video, such as motor manifestations.  No electrographic seizures, no epileptiform discharges are seen.  Clinical correlation is advised.         CHARANJIT PERKINS MD             D: 2017 14:28   T: 2017 21:14   MT: FLOYD      Name:     CADY APARICIO   MRN:      [id number]        Account:        [id number]   :      1995           Procedure Date: 2017      Document: [id number]

## 2017-06-20 ENCOUNTER — CARE COORDINATION (OUTPATIENT)
Dept: CARE COORDINATION | Facility: CLINIC | Age: 22
End: 2017-06-20

## 2017-06-20 NOTE — PROGRESS NOTES
"[facility]  \"Hello, my name is Farnaz Selby , and I am calling from the [facility].  I want to check in and see how you are doing, after leaving the hospital.  You may also receive a call from your Care Coordinator (care team), but I want to make sure you don t have any urgent needs.  I have a couple questions to review with you:     Post-Discharge Outreach                                                    Libra Lieberman is a 21 year old female         Care Team:    Patient Care Team       Relationship Specialty Notifications Start End    Dinah Smith MD PCP - General   6/15/17     Phone: [phone] Fax: [phone]         Replaced by [facility] [address] [facility] [address]            Transition of Care Review                                                      Patient was called three times and no answer so post 24 hr DC follow up calls will be closed out                   Plan                                                      Thanks for your time.  Your Care Coordinator may follow-up within the next couple days.  In the meantime if you have questions, concerns or problems call your care team.        Farnaz Selby    "

## 2017-08-12 ENCOUNTER — HEALTH MAINTENANCE LETTER (OUTPATIENT)
Age: 22
End: 2017-08-12

## 2017-09-26 ENCOUNTER — OFFICE VISIT (OUTPATIENT)
Dept: FAMILY MEDICINE | Facility: CLINIC | Age: 22
End: 2017-09-26
Payer: COMMERCIAL

## 2017-09-26 VITALS
HEART RATE: 74 BPM | OXYGEN SATURATION: 99 % | TEMPERATURE: 98.3 F | BODY MASS INDEX: 30.65 KG/M2 | HEIGHT: 63 IN | RESPIRATION RATE: 12 BRPM | DIASTOLIC BLOOD PRESSURE: 74 MMHG | WEIGHT: 173 LBS | SYSTOLIC BLOOD PRESSURE: 110 MMHG

## 2017-09-26 DIAGNOSIS — M79.89 FOOT SWELLING: ICD-10-CM

## 2017-09-26 DIAGNOSIS — R42 DIZZINESS: ICD-10-CM

## 2017-09-26 DIAGNOSIS — R30.0 DYSURIA: Primary | ICD-10-CM

## 2017-09-26 DIAGNOSIS — R07.0 THROAT PAIN: ICD-10-CM

## 2017-09-26 DIAGNOSIS — R56.9 SEIZURE (H): ICD-10-CM

## 2017-09-26 LAB
ALBUMIN UR-MCNC: NEGATIVE MG/DL
ANION GAP SERPL CALCULATED.3IONS-SCNC: 5 MMOL/L (ref 3–14)
APPEARANCE UR: CLEAR
BACTERIA #/AREA URNS HPF: ABNORMAL /HPF
BILIRUB UR QL STRIP: NEGATIVE
BUN SERPL-MCNC: 10 MG/DL (ref 7–30)
CALCIUM SERPL-MCNC: 8.5 MG/DL (ref 8.5–10.1)
CHLORIDE SERPL-SCNC: 103 MMOL/L (ref 94–109)
CO2 SERPL-SCNC: 30 MMOL/L (ref 20–32)
COLOR UR AUTO: YELLOW
CREAT SERPL-MCNC: 0.74 MG/DL (ref 0.52–1.04)
DEPRECATED S PYO AG THROAT QL EIA: NORMAL
ERYTHROCYTE [DISTWIDTH] IN BLOOD BY AUTOMATED COUNT: 13 % (ref 10–15)
GFR SERPL CREATININE-BSD FRML MDRD: >90 ML/MIN/1.7M2
GLUCOSE SERPL-MCNC: 88 MG/DL (ref 70–99)
GLUCOSE UR STRIP-MCNC: NEGATIVE MG/DL
HCT VFR BLD AUTO: 42.8 % (ref 35–47)
HETEROPH AB SER QL: NEGATIVE
HGB BLD-MCNC: 14.1 G/DL (ref 11.7–15.7)
HGB UR QL STRIP: NEGATIVE
KETONES UR STRIP-MCNC: NEGATIVE MG/DL
LEUKOCYTE ESTERASE UR QL STRIP: ABNORMAL
MCH RBC QN AUTO: 29.1 PG (ref 26.5–33)
MCHC RBC AUTO-ENTMCNC: 32.9 G/DL (ref 31.5–36.5)
MCV RBC AUTO: 88 FL (ref 78–100)
NITRATE UR QL: NEGATIVE
NON-SQ EPI CELLS #/AREA URNS LPF: ABNORMAL /LPF
PH UR STRIP: 7 PH (ref 5–7)
PLATELET # BLD AUTO: 218 10E9/L (ref 150–450)
POTASSIUM SERPL-SCNC: 4.1 MMOL/L (ref 3.4–5.3)
RBC # BLD AUTO: 4.85 10E12/L (ref 3.8–5.2)
RBC #/AREA URNS AUTO: ABNORMAL /HPF
SODIUM SERPL-SCNC: 138 MMOL/L (ref 133–144)
SOURCE: ABNORMAL
SP GR UR STRIP: 1.02 (ref 1–1.03)
SPECIMEN SOURCE: NORMAL
UROBILINOGEN UR STRIP-ACNC: 0.2 EU/DL (ref 0.2–1)
WBC # BLD AUTO: 7.3 10E9/L (ref 4–11)
WBC #/AREA URNS AUTO: ABNORMAL /HPF

## 2017-09-26 PROCEDURE — 80048 BASIC METABOLIC PNL TOTAL CA: CPT | Performed by: NURSE PRACTITIONER

## 2017-09-26 PROCEDURE — 99214 OFFICE O/P EST MOD 30 MIN: CPT | Performed by: NURSE PRACTITIONER

## 2017-09-26 PROCEDURE — 87880 STREP A ASSAY W/OPTIC: CPT | Performed by: NURSE PRACTITIONER

## 2017-09-26 PROCEDURE — 36415 COLL VENOUS BLD VENIPUNCTURE: CPT | Performed by: NURSE PRACTITIONER

## 2017-09-26 PROCEDURE — 87086 URINE CULTURE/COLONY COUNT: CPT | Performed by: NURSE PRACTITIONER

## 2017-09-26 PROCEDURE — 87081 CULTURE SCREEN ONLY: CPT | Mod: 59 | Performed by: NURSE PRACTITIONER

## 2017-09-26 PROCEDURE — 86308 HETEROPHILE ANTIBODY SCREEN: CPT | Performed by: NURSE PRACTITIONER

## 2017-09-26 PROCEDURE — 85027 COMPLETE CBC AUTOMATED: CPT | Performed by: NURSE PRACTITIONER

## 2017-09-26 PROCEDURE — 81001 URINALYSIS AUTO W/SCOPE: CPT | Performed by: NURSE PRACTITIONER

## 2017-09-26 NOTE — MR AVS SNAPSHOT
After Visit Summary   9/26/2017    Libra Lieberman    MRN: [id number]           Patient Information     Date Of Birth          1995        Visit Information        Provider Department      9/26/2017 2:00 PM Tammy Dave NP [facility]        Today's Diagnoses     Dysuria    -  1    Throat pain        Dizziness        Foot swelling          Care Instructions    Return in 5-7 days if not improving. We will contact you by 4-Tell with your lab results.          Follow-ups after your visit        Who to contact     If you have questions or need follow up information about today's clinic visit or your schedule please contact [facility] directly at [phone].  Normal or non-critical lab and imaging results will be communicated to you by StrikeAdhart, letter or phone within 4 business days after the clinic has received the results. If you do not hear from us within 7 days, please contact the clinic through Into [employer] Gl[employer] or phone. If you have a critical or abnormal lab result, we will notify you by phone as soon as possible.  Submit refill requests through 4-Tell or call your pharmacy and they will forward the refill request to us. Please allow 3 business days for your refill to be completed.          Additional Information About Your Visit        MyChart Information     4-Tell gives you secure access to your electronic health record. If you see a primary care provider, you can also send messages to your care team and make appointments. If you have questions, please call your primary care clinic.  If you do not have a primary care provider, please call [phone] and they will assist you.        Care EveryWhere ID     This is your Care EveryWhere ID. This could be used by other organizations to access your [place] medical records  [id number]        Your Vitals Were     Pulse Temperature Respirations Height Pulse Oximetry BMI (Body Mass Index)    74 98.3  F (36.8  C) (Oral)  "12 1.6 m (5' 3\") 99% 30.65 kg/m2       Blood Pressure from Last 3 Encounters:   09/26/17 110/74   06/16/17 90/55   12/26/16 108/63    Weight from Last 3 Encounters:   09/26/17 78.5 kg (173 lb)   06/15/17 76.6 kg (168 lb 14 oz)   12/26/16 82.7 kg (182 lb 6 oz)              We Performed the Following     Basic metabolic panel     CBC with platelets     Mononucleosis screen     Strep, Rapid Screen     UA reflex to Microscopic and Culture     Urine Culture Aerobic Bacterial     Urine Microscopic        Primary Care Provider Office Phone # Fax #    Dinah Smith [phone]       [address] [address]        Equal Access to Services     DEVIN CHERY : Maryanne gutierrez Soonur, waaxda luqadaha, qaybta kaalmada adeegyada, nicolas weber. So [facility] [phone].    ATENCIÓN: Si habla español, tiene a bazan disposición servicios gratuitos de asistencia lingüística. Niesha al [phone].    We comply with applicable federal civil rights laws and [place] laws. We do not discriminate on the basis of race, color, national origin, age, disability sex, sexual orientation or gender identity.            Thank you!     Thank you for choosing [facility]  for your care. Our goal is always to provide you with excellent care. Hearing back from our patients is one way we can continue to improve our services. Please take a few minutes to complete the written survey that you may receive in the mail after your visit with us. Thank you!             Your Updated Medication List - Protect others around you: Learn how to safely use, store and throw away your medicines at www.disposemymeds.org.          This list is accurate as of: 9/26/17  2:29 PM.  Always use your most recent med list.                   Brand Name Dispense Instructions for use Diagnosis    citalopram 20 MG tablet    celeXA     Take 1 and 1/2 tablets (30 mg) by mouth " daily        lamoTRIgine 100 MG tablet    LaMICtal     Take 100 mg by mouth 2 times daily

## 2017-09-26 NOTE — PROGRESS NOTES
SUBJECTIVE:   Libra Lieberman is a 21 year old female who presents to clinic today for the following health issues:      Acute Illness   Acute illness concerns: URI  Onset: last wednesday    Fever: YES- onset    Chills/Sweats: no    Headache (location?): YES    Sinus Pressure:YES    Conjunctivitis:  no    Ear Pain: no    Rhinorrhea: no     Congestion: YES    Sore Throat: YES     Cough: YES    Wheeze: no    Decreased Appetite: no    Nausea: yes    Vomiting: no    Diarrhea:  no    Dysuria/Freq.: YES    Fatigue/Achiness: YES    Sick/Strep Exposure: YES- strep little sister had strep     Therapies Tried and outcome: tylenol helped some with pain    URI: having post-nasal dripping, +nausea. Very dizzy, fatigued.  Sister had strep, wants to make sure she doesn't have it.     Swelling: Feels like feet, hands have been swelling lately. Feels feet get so swollen for past few days. Had to stay in all day a few days ago due to swelling. Increasing fluids didn't help. Didn't wear her rings today, she was afraid she wouldn't get them off. Uses salt but not excessively. Feels like she is eating more than usual.     When dizzy feels like the room is spinning. On-going for past few days.     Dysuria: started about a week ago, increased fluids has not helped. No hematuria, abdominal pain or flank pain.     Works full time as .       Problem list and histories reviewed & adjusted, as indicated.  Additional history: as documented    Patient Active Problem List   Diagnosis     Anxiety     Benign paroxysmal positional vertigo     CARDIOVASCULAR SCREENING; LDL GOAL LESS THAN 160     Cysticercosis     Acquired absence of other organs     Personal history of infectious and parasitic disease     Headache     Malaise and fatigue     Generalized muscle weakness     H/O magnetic resonance imaging of brain and brain stem     Family planning     Cerebral cyst     Depression     Weakness     History of varicella     History of  cholecystectomy     Insomnia     Weakness of left side of body     Low grade squamous intraepithelial lesion (LGSIL) on cervicovaginal cytologic smear     Neurocysticercosis     Nondependent cocaine abuse, episodic     Encounter for supervision of normal first pregnancy     Seizure (H)     Other general symptoms and signs     Abnormal behavior     Altered level of consciousness     Vaginal delivery     Vertigo     Spells     History reviewed. No pertinent surgical history.    Social History   Substance Use Topics     Smoking status: Never Smoker     Smokeless tobacco: Never Used     Alcohol use No     Family History   Problem Relation Age of Onset     Hypertension Mother      DIABETES Mother      Depression Mother      Lipids Mother      Asthma Brother      Cancer - colorectal Other 13     colon cancer     Thyroid Disease No family hx of      HEART DISEASE No family hx of      Unknown/Adopted No family hx of      Family History Negative No family hx of      C.A.D. No family hx of      CEREBROVASCULAR DISEASE No family hx of      Breast Cancer No family hx of      Prostate Cancer No family hx of      Alcohol/Drug No family hx of      Allergies No family hx of      Alzheimer Disease No family hx of      Anesthesia Reaction No family hx of      Arthritis No family hx of      Blood Disease No family hx of      CANCER No family hx of      Cardiovascular No family hx of      Circulatory No family hx of      Congenital Anomalies No family hx of      Connective Tissue Disorder No family hx of      Endocrine Disease No family hx of      Eye Disorder No family hx of      Genetic Disorder No family hx of      GASTROINTESTINAL DISEASE No family hx of      Genitourinary Problems No family hx of      Gynecology No family hx of      Musculoskeletal Disorder No family hx of      Neurologic Disorder No family hx of      Obesity No family hx of      OSTEOPOROSIS No family hx of      Psychotic Disorder No family hx of      Respiratory  "No family hx of      Hearing Loss No family hx of          Current Outpatient Prescriptions   Medication Sig Dispense Refill     lamoTRIgine (LAMICTAL) 100 MG tablet Take 100 mg by mouth 2 times daily       citalopram (CELEXA) 20 MG tablet Take 1 and 1/2 tablets (30 mg) by mouth daily       Allergies   Allergen Reactions     Bupropion      Other reaction(s): Seizures  Per patient     Levetiracetam Anxiety     Other reaction(s): Behavioral Disturbances   Developed worsening depression, reduced appetite   isturbances         Reviewed and updated as needed this visit by clinical staffTobacco  Allergies  Meds  Problems  Med Hx  Surg Hx  Fam Hx  Soc Hx        Reviewed and updated as needed this visit by Provider  Allergies  Meds  Problems  Med Hx  Surg Hx         ROS:  Constitutional, HEENT, cardiovascular, pulmonary, gi and gu systems are negative, except as otherwise noted.      OBJECTIVE:   /74 (BP Location: Right arm, Patient Position: Sitting, Cuff Size: Adult Regular)  Pulse 74  Temp 98.3  F (36.8  C) (Oral)  Resp 12  Ht 1.6 m (5' 3\")  Wt 78.5 kg (173 lb)  SpO2 99%  BMI 30.65 kg/m2  Body mass index is 30.65 kg/(m^2).  GENERAL: ill appearing, alert and no acute distress  EYES: Eyes grossly normal to inspection, PERRL and conjunctivae and sclerae normal  HENT: ear canals and TM's normal, nose and mouth without ulcers or lesions. Posterior pharynx slight erythema, no exudate.   NECK: no adenopathy, no asymmetry, masses, or scars and thyroid normal to palpation  RESP: lungs clear to auscultation - no rales, rhonchi or wheezes  CV: regular rate and rhythm, normal S1 S2, no S3 or S4, no murmur, click or rub, no peripheral edema noted today. No hand edema.  ABDOMEN: soft, generalized slight tenderness throughout, no hepatosplenomegaly, no masses and bowel sounds normal  MS: no gross musculoskeletal defects noted, no edema  NEURO: Normal strength and tone, mentation intact and speech normal. Normal " reflexes. EOM caused slight dizziness as did laying down then sitting up-no nystagmus     Diagnostic Test Results:  Results for orders placed or performed in visit on 09/26/17 (from the past 24 hour(s))   UA reflex to Microscopic and Culture   Result Value Ref Range    Color Urine Yellow     Appearance Urine Clear     Glucose Urine Negative NEG^Negative mg/dL    Bilirubin Urine Negative NEG^Negative    Ketones Urine Negative NEG^Negative mg/dL    Specific Gravity Urine 1.020 1.003 - 1.035    Blood Urine Negative NEG^Negative    pH Urine 7.0 5.0 - 7.0 pH    Protein Albumin Urine Negative NEG^Negative mg/dL    Urobilinogen Urine 0.2 0.2 - 1.0 EU/dL    Nitrite Urine Negative NEG^Negative    Leukocyte Esterase Urine Trace (A) NEG^Negative    Source Midstream Urine    Urine Microscopic   Result Value Ref Range    WBC Urine 2-5 (A) OTO2^O - 2 /HPF    RBC Urine O - 2 OTO2^O - 2 /HPF    Squamous Epithelial /LPF Urine Moderate (A) FEW^Few /LPF    Bacteria Urine Few (A) NEG^Negative /HPF   Strep, Rapid Screen   Result Value Ref Range    Specimen Description Throat     Rapid Strep A Screen       NEGATIVE: No Group A streptococcal antigen detected by immunoassay, await culture report.       ASSESSMENT/PLAN:       1. Dysuria  UA negative but will check UC. Discussed no antibiotics at this time for this. Push fluids  - UA reflex to Microscopic and Culture  - Urine Microscopic  - Urine Culture Aerobic Bacterial  - CBC with platelets    2. Throat pain  Neg strep. Check for mono. Likely viral infection.   - Strep, Rapid Screen  - Mononucleosis screen    3. Dizziness  Check labs, push fluids. BP is stable today.  Unlikely her medications are causing this symptoms.   - CBC with platelets  - Basic metabolic panel  - Mononucleosis screen    4. Foot swelling  Also having some hand swelling. Nothing noted today. She said it was worse this morning and over the weekend, and had to remove her shoes. Decrease salt intake, push fluids. Monitor  for now.     5. Seizure (H)  Has not follow up with Neurology at [facility] since June hospitalization. Encouraged to do so. Not likely her hand/feet swelling is related but should have follow up with them anyways.       FUTURE APPOINTMENTS:       - Follow-up visit in 5-7 days if not improving.     NIKO Fernandez, NP-C  [facility]

## 2017-09-26 NOTE — NURSING NOTE
"Chief Complaint   Patient presents with     Pharyngitis     UTI       Initial /74 (BP Location: Right arm, Patient Position: Sitting, Cuff Size: Adult Regular)  Pulse 74  Temp 98.3  F (36.8  C) (Oral)  Resp 12  Ht 1.6 m (5' 3\")  Wt 78.5 kg (173 lb)  SpO2 99%  BMI 30.65 kg/m2 Estimated body mass index is 30.65 kg/(m^2) as calculated from the following:    Height as of this encounter: 1.6 m (5' 3\").    Weight as of this encounter: 78.5 kg (173 lb).  Medication Reconciliation: dean Rodriguez        "

## 2017-09-27 LAB
BACTERIA SPEC CULT: NORMAL
BACTERIA SPEC CULT: NORMAL
SPECIMEN SOURCE: NORMAL
SPECIMEN SOURCE: NORMAL

## 2018-04-10 ENCOUNTER — OFFICE VISIT (OUTPATIENT)
Dept: URGENT CARE | Facility: URGENT CARE | Age: 23
End: 2018-04-10

## 2018-04-10 VITALS
DIASTOLIC BLOOD PRESSURE: 50 MMHG | SYSTOLIC BLOOD PRESSURE: 82 MMHG | RESPIRATION RATE: 20 BRPM | TEMPERATURE: 98.3 F | WEIGHT: 185 LBS | HEART RATE: 67 BPM | BODY MASS INDEX: 32.77 KG/M2

## 2018-04-10 DIAGNOSIS — S39.92XA LOWER BACK INJURY, INITIAL ENCOUNTER: ICD-10-CM

## 2018-04-10 DIAGNOSIS — S50.01XA CONTUSION OF RIGHT ELBOW, INITIAL ENCOUNTER: Primary | ICD-10-CM

## 2018-04-10 PROCEDURE — 99214 OFFICE O/P EST MOD 30 MIN: CPT | Performed by: PHYSICIAN ASSISTANT

## 2018-04-10 RX ORDER — IBUPROFEN 800 MG/1
800 TABLET, FILM COATED ORAL EVERY 8 HOURS PRN
Qty: 30 TABLET | Refills: 0 | Status: ON HOLD | OUTPATIENT
Start: 2018-04-10 | End: 2019-08-29

## 2018-04-10 RX ORDER — METHOCARBAMOL 750 MG/1
750 TABLET, FILM COATED ORAL 4 TIMES DAILY PRN
Qty: 40 TABLET | Refills: 0 | Status: SHIPPED | OUTPATIENT
Start: 2018-04-10 | End: 2019-08-28

## 2018-04-10 NOTE — PATIENT INSTRUCTIONS
(S50.01XA) Contusion of right elbow, initial encounter  (primary encounter diagnosis)  Comment:   Plan: ibuprofen (ADVIL/MOTRIN) 800 MG tablet            (S39.92XA) Lower back injury, initial encounter  Comment:   Plan: methocarbamol (ROBAXIN) 750 MG tablet            Ice to area over thin cloth for 20 minutes 3-4 times a day.     Follow up with primary clinic should symptoms persist or worsen.

## 2018-04-10 NOTE — LETTER
[facility]  [address] [address]  [phone]      April 10, 2018    RE:  Libra Lieberman                                                                                                                                                       [address] [facility] [address]            To whom it may concern:    Libra Lieberman was seen in clinic this morning.          Sincerely,        Jeane Melendez [facility]

## 2018-04-10 NOTE — MR AVS SNAPSHOT
After Visit Summary   4/10/2018    Libra Lieberman    MRN: [id number]           Patient Information     Date Of Birth          1995        Visit Information        Provider Department      4/10/2018 10:20 AM Jeane Morales PA-C [facility]        Today's Diagnoses     Contusion of right elbow, initial encounter    -  1    Lower back injury, initial encounter          Care Instructions    (S50.01XA) Contusion of right elbow, initial encounter  (primary encounter diagnosis)  Comment:   Plan: ibuprofen (ADVIL/MOTRIN) 800 MG tablet            (S39.92XA) Lower back injury, initial encounter  Comment:   Plan: methocarbamol (ROBAXIN) 750 MG tablet            Ice to area over thin cloth for 20 minutes 3-4 times a day.     Follow up with primary clinic should symptoms persist or worsen.                    Follow-ups after your visit        Who to contact     If you have questions or need follow up information about today's clinic visit or your schedule please contact [facility] directly at [phone].  Normal or non-critical lab and imaging results will be communicated to you by GenVaulthart, letter or phone within 4 business days after the clinic has received the results. If you do not hear from us within 7 days, please contact the clinic through Graviet or phone. If you have a critical or abnormal lab result, we will notify you by phone as soon as possible.  Submit refill requests through GRUZOBZOR or call your pharmacy and they will forward the refill request to us. Please allow 3 business days for your refill to be completed.          Additional Information About Your Visit        GenVaulthart Information     GRUZOBZOR gives you secure access to your electronic health record. If you see a primary care provider, you can also send messages to your care team and make appointments. If you have questions, please call your primary care clinic.  If  you do not have a primary care provider, please call [phone] and they will assist you.        Care EveryWhere ID     This is your Care EveryWhere ID. This could be used by other organizations to access your [place] medical records  [id number]        Your Vitals Were     Pulse Temperature Respirations BMI (Body Mass Index)          67 98.3  F (36.8  C) 20 32.77 kg/m2         Blood Pressure from Last 3 Encounters:   04/10/18 (!) 82/50   09/26/17 110/74   06/16/17 90/55    Weight from Last 3 Encounters:   04/10/18 185 lb (83.9 kg)   09/26/17 173 lb (78.5 kg)   06/15/17 168 lb 14 oz (76.6 kg)              Today, you had the following     No orders found for display         Today's Medication Changes          These changes are accurate as of 4/10/18 11:41 AM.  If you have any questions, ask your nurse or doctor.               Start taking these medicines.        Dose/Directions    ibuprofen 800 MG tablet   Commonly known as:  ADVIL/MOTRIN   Used for:  Contusion of right elbow, initial encounter   Started by:  Jeane Morales PA-C        Dose:  800 mg   Take 1 tablet (800 mg) by mouth every 8 hours as needed for moderate pain   Quantity:  30 tablet   Refills:  0       methocarbamol 750 MG tablet   Commonly known as:  ROBAXIN   Used for:  Lower back injury, initial encounter   Started by:  Jeane Morales PA-C        Dose:  750 mg   Take 1 tablet (750 mg) by mouth 4 times daily as needed for muscle spasms   Quantity:  40 tablet   Refills:  0            Where to get your medicines      These medications were sent to [facility] [address] [address]     Phone:  [phone]     ibuprofen 800 MG tablet    methocarbamol 750 MG tablet                Primary Care Provider Office Phone # Fax #    Dinah Smith [phone]       [address] [address]        Equal Access  to Services     DEVIN CHERY : Hadii ratna Good, wasonyda jamesqadaha, qailamerrick kaalmanicolas wood. So [facility] [phone].    ATENCIÓN: Si habla espcarole, tiene a bazan disposición servicios gratuitos de asistencia lingüística. Llame al [phone].    We comply with applicable federal civil rights laws and [place] laws. We do not discriminate on the basis of race, color, national origin, age, disability, sex, sexual orientation, or gender identity.            Thank you!     Thank you for choosing [facility]  for your care. Our goal is always to provide you with excellent care. Hearing back from our patients is one way we can continue to improve our services. Please take a few minutes to complete the written survey that you may receive in the mail after your visit with us. Thank you!             Your Updated Medication List - Protect others around you: Learn how to safely use, store and throw away your medicines at www.disposemymeds.org.          This list is accurate as of 4/10/18 11:41 AM.  Always use your most recent med list.                   Brand Name Dispense Instructions for use Diagnosis    citalopram 20 MG tablet    celeXA     Take 1 and 1/2 tablets (30 mg) by mouth daily        ibuprofen 800 MG tablet    ADVIL/MOTRIN    30 tablet    Take 1 tablet (800 mg) by mouth every 8 hours as needed for moderate pain    Contusion of right elbow, initial encounter       lamoTRIgine 100 MG tablet    LaMICtal     Take 100 mg by mouth 2 times daily        methocarbamol 750 MG tablet    ROBAXIN    40 tablet    Take 1 tablet (750 mg) by mouth 4 times daily as needed for muscle spasms    Lower back injury, initial encounter
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
## 2019-08-28 ENCOUNTER — HOSPITAL ENCOUNTER (OUTPATIENT)
Facility: CLINIC | Age: 24
Setting detail: OBSERVATION
Discharge: ANOTHER HOSPITAL | End: 2019-08-29
Attending: EMERGENCY MEDICINE | Admitting: PEDIATRICS
Payer: COMMERCIAL

## 2019-08-28 DIAGNOSIS — T43.592A INTENTIONAL DROPERIDOL OVERDOSE (H): ICD-10-CM

## 2019-08-28 DIAGNOSIS — T50.902A OVERDOSE, INTENTIONAL SELF-HARM, INITIAL ENCOUNTER (H): ICD-10-CM

## 2019-08-28 DIAGNOSIS — T42.4X2A: ICD-10-CM

## 2019-08-28 LAB
ALBUMIN SERPL-MCNC: 3.9 G/DL (ref 3.4–5)
ALP SERPL-CCNC: 83 U/L (ref 40–150)
ALT SERPL W P-5'-P-CCNC: 25 U/L (ref 0–50)
AMPHETAMINES UR QL SCN: NEGATIVE
ANION GAP SERPL CALCULATED.3IONS-SCNC: 7 MMOL/L (ref 3–14)
APAP SERPL-MCNC: <2 MG/L (ref 10–20)
AST SERPL W P-5'-P-CCNC: 14 U/L (ref 0–45)
BARBITURATES UR QL: NEGATIVE
BASOPHILS # BLD AUTO: 0 10E9/L (ref 0–0.2)
BASOPHILS NFR BLD AUTO: 0.4 %
BENZODIAZ UR QL: NEGATIVE
BILIRUB SERPL-MCNC: 0.2 MG/DL (ref 0.2–1.3)
BUN SERPL-MCNC: 13 MG/DL (ref 7–30)
CALCIUM SERPL-MCNC: 8.6 MG/DL (ref 8.5–10.1)
CANNABINOIDS UR QL SCN: NEGATIVE
CHLORIDE SERPL-SCNC: 106 MMOL/L (ref 94–109)
CO2 SERPL-SCNC: 28 MMOL/L (ref 20–32)
COCAINE UR QL: NEGATIVE
CREAT SERPL-MCNC: 0.52 MG/DL (ref 0.52–1.04)
DIFFERENTIAL METHOD BLD: NORMAL
EOSINOPHIL # BLD AUTO: 0 10E9/L (ref 0–0.7)
EOSINOPHIL NFR BLD AUTO: 0.5 %
ERYTHROCYTE [DISTWIDTH] IN BLOOD BY AUTOMATED COUNT: 12.1 % (ref 10–15)
ETHANOL SERPL-MCNC: <0.01 G/DL
ETHANOL UR QL SCN: NEGATIVE
GFR SERPL CREATININE-BSD FRML MDRD: >90 ML/MIN/{1.73_M2}
GLUCOSE BLDC GLUCOMTR-MCNC: 120 MG/DL (ref 70–99)
GLUCOSE SERPL-MCNC: 99 MG/DL (ref 70–99)
HCG UR QL: NEGATIVE
HCT VFR BLD AUTO: 42.4 % (ref 35–47)
HGB BLD-MCNC: 14 G/DL (ref 11.7–15.7)
IMM GRANULOCYTES # BLD: 0 10E9/L (ref 0–0.4)
IMM GRANULOCYTES NFR BLD: 0.3 %
LYMPHOCYTES # BLD AUTO: 3 10E9/L (ref 0.8–5.3)
LYMPHOCYTES NFR BLD AUTO: 38.8 %
MCH RBC QN AUTO: 28.9 PG (ref 26.5–33)
MCHC RBC AUTO-ENTMCNC: 33 G/DL (ref 31.5–36.5)
MCV RBC AUTO: 87 FL (ref 78–100)
MONOCYTES # BLD AUTO: 0.5 10E9/L (ref 0–1.3)
MONOCYTES NFR BLD AUTO: 6.8 %
NEUTROPHILS # BLD AUTO: 4 10E9/L (ref 1.6–8.3)
NEUTROPHILS NFR BLD AUTO: 53.2 %
NRBC # BLD AUTO: 0 10*3/UL
NRBC BLD AUTO-RTO: 0 /100
OPIATES UR QL SCN: NEGATIVE
PLATELET # BLD AUTO: 230 10E9/L (ref 150–450)
POTASSIUM SERPL-SCNC: 3.6 MMOL/L (ref 3.4–5.3)
PROT SERPL-MCNC: 7.8 G/DL (ref 6.8–8.8)
RBC # BLD AUTO: 4.85 10E12/L (ref 3.8–5.2)
SALICYLATES SERPL-MCNC: <2 MG/DL
SODIUM SERPL-SCNC: 141 MMOL/L (ref 133–144)
WBC # BLD AUTO: 7.6 10E9/L (ref 4–11)

## 2019-08-28 PROCEDURE — 85025 COMPLETE CBC W/AUTO DIFF WBC: CPT | Performed by: EMERGENCY MEDICINE

## 2019-08-28 PROCEDURE — 99285 EMERGENCY DEPT VISIT HI MDM: CPT | Mod: 25

## 2019-08-28 PROCEDURE — 80307 DRUG TEST PRSMV CHEM ANLYZR: CPT | Mod: 59 | Performed by: EMERGENCY MEDICINE

## 2019-08-28 PROCEDURE — 25800030 ZZH RX IP 258 OP 636: Performed by: EMERGENCY MEDICINE

## 2019-08-28 PROCEDURE — 80320 DRUG SCREEN QUANTALCOHOLS: CPT | Mod: 59 | Performed by: EMERGENCY MEDICINE

## 2019-08-28 PROCEDURE — 00000146 ZZHCL STATISTIC GLUCOSE BY METER IP

## 2019-08-28 PROCEDURE — 81025 URINE PREGNANCY TEST: CPT | Performed by: FAMILY MEDICINE

## 2019-08-28 PROCEDURE — 96361 HYDRATE IV INFUSION ADD-ON: CPT

## 2019-08-28 PROCEDURE — 93005 ELECTROCARDIOGRAM TRACING: CPT

## 2019-08-28 PROCEDURE — 96360 HYDRATION IV INFUSION INIT: CPT

## 2019-08-28 PROCEDURE — 93010 ELECTROCARDIOGRAM REPORT: CPT | Mod: Z6 | Performed by: EMERGENCY MEDICINE

## 2019-08-28 PROCEDURE — 80329 ANALGESICS NON-OPIOID 1 OR 2: CPT | Performed by: EMERGENCY MEDICINE

## 2019-08-28 PROCEDURE — 80053 COMPREHEN METABOLIC PANEL: CPT | Performed by: EMERGENCY MEDICINE

## 2019-08-28 PROCEDURE — 99285 EMERGENCY DEPT VISIT HI MDM: CPT | Mod: 25 | Performed by: EMERGENCY MEDICINE

## 2019-08-28 PROCEDURE — 25000128 H RX IP 250 OP 636: Performed by: EMERGENCY MEDICINE

## 2019-08-28 PROCEDURE — 80307 DRUG TEST PRSMV CHEM ANLYZR: CPT | Performed by: EMERGENCY MEDICINE

## 2019-08-28 RX ORDER — SODIUM CHLORIDE 9 MG/ML
1000 INJECTION, SOLUTION INTRAVENOUS CONTINUOUS
Status: DISCONTINUED | OUTPATIENT
Start: 2019-08-28 | End: 2019-08-29

## 2019-08-28 RX ORDER — QUETIAPINE FUMARATE 25 MG/1
25 TABLET, FILM COATED ORAL DAILY
Status: ON HOLD | COMMUNITY
End: 2019-08-30

## 2019-08-28 RX ORDER — LORAZEPAM 2 MG/1
2 TABLET ORAL DAILY PRN
Status: ON HOLD | COMMUNITY
End: 2019-08-29

## 2019-08-28 RX ORDER — QUETIAPINE FUMARATE 100 MG/1
100 TABLET, FILM COATED ORAL AT BEDTIME
Status: ON HOLD | COMMUNITY
End: 2019-08-30

## 2019-08-28 RX ADMIN — SODIUM CHLORIDE 1000 ML: 9 INJECTION, SOLUTION INTRAVENOUS at 23:59

## 2019-08-28 RX ADMIN — SODIUM CHLORIDE 1000 ML: 9 INJECTION, SOLUTION INTRAVENOUS at 22:54

## 2019-08-28 ASSESSMENT — ENCOUNTER SYMPTOMS: FATIGUE: 1

## 2019-08-29 ENCOUNTER — TELEPHONE (OUTPATIENT)
Dept: BEHAVIORAL HEALTH | Facility: CLINIC | Age: 24
End: 2019-08-29

## 2019-08-29 ENCOUNTER — HOSPITAL ENCOUNTER (INPATIENT)
Facility: CLINIC | Age: 24
LOS: 2 days | Discharge: HOME | DRG: 885 | End: 2019-08-31
Attending: PSYCHIATRY & NEUROLOGY | Admitting: PSYCHIATRY & NEUROLOGY
Payer: COMMERCIAL

## 2019-08-29 VITALS
HEIGHT: 61 IN | SYSTOLIC BLOOD PRESSURE: 90 MMHG | HEART RATE: 79 BPM | BODY MASS INDEX: 42.86 KG/M2 | DIASTOLIC BLOOD PRESSURE: 55 MMHG | RESPIRATION RATE: 16 BRPM | OXYGEN SATURATION: 99 % | TEMPERATURE: 98.9 F | WEIGHT: 227 LBS

## 2019-08-29 DIAGNOSIS — F25.0 SCHIZOAFFECTIVE DISORDER, BIPOLAR TYPE (H): Primary | ICD-10-CM

## 2019-08-29 DIAGNOSIS — F41.9 ANXIETY: ICD-10-CM

## 2019-08-29 PROBLEM — F32.A DEPRESSED: Status: ACTIVE | Noted: 2019-08-29

## 2019-08-29 PROBLEM — T42.4X1A OVERDOSE OF BENZODIAZEPINE: Status: ACTIVE | Noted: 2019-08-29

## 2019-08-29 LAB
GLUCOSE BLDC GLUCOMTR-MCNC: 79 MG/DL (ref 70–99)
INTERPRETATION ECG - MUSE: NORMAL

## 2019-08-29 PROCEDURE — G0378 HOSPITAL OBSERVATION PER HR: HCPCS

## 2019-08-29 PROCEDURE — 25800030 ZZH RX IP 258 OP 636: Performed by: EMERGENCY MEDICINE

## 2019-08-29 PROCEDURE — 96361 HYDRATE IV INFUSION ADD-ON: CPT

## 2019-08-29 PROCEDURE — 25000132 ZZH RX MED GY IP 250 OP 250 PS 637: Performed by: PSYCHIATRY & NEUROLOGY

## 2019-08-29 PROCEDURE — 99214 OFFICE O/P EST MOD 30 MIN: CPT | Performed by: INTERNAL MEDICINE

## 2019-08-29 PROCEDURE — 25000132 ZZH RX MED GY IP 250 OP 250 PS 637: Performed by: CLINICAL NURSE SPECIALIST

## 2019-08-29 PROCEDURE — 99207 ZZC CDG-CODE CATEGORY CHANGED: CPT | Performed by: INTERNAL MEDICINE

## 2019-08-29 PROCEDURE — 00000146 ZZHCL STATISTIC GLUCOSE BY METER IP

## 2019-08-29 PROCEDURE — 25000128 H RX IP 250 OP 636: Performed by: EMERGENCY MEDICINE

## 2019-08-29 PROCEDURE — 99221 1ST HOSP IP/OBS SF/LOW 40: CPT | Performed by: PSYCHIATRY & NEUROLOGY

## 2019-08-29 PROCEDURE — 99220 ZZC INITIAL OBSERVATION CARE,LEVL III: CPT | Performed by: PEDIATRICS

## 2019-08-29 PROCEDURE — 12400001 ZZH R&B MH UMMC

## 2019-08-29 RX ORDER — BISACODYL 10 MG
10 SUPPOSITORY, RECTAL RECTAL DAILY PRN
Status: DISCONTINUED | OUTPATIENT
Start: 2019-08-29 | End: 2019-08-31 | Stop reason: HOSPADM

## 2019-08-29 RX ORDER — NALOXONE HYDROCHLORIDE 0.4 MG/ML
.1-.4 INJECTION, SOLUTION INTRAMUSCULAR; INTRAVENOUS; SUBCUTANEOUS
Status: DISCONTINUED | OUTPATIENT
Start: 2019-08-29 | End: 2019-08-29 | Stop reason: HOSPADM

## 2019-08-29 RX ORDER — QUETIAPINE FUMARATE 50 MG/1
50 TABLET, FILM COATED ORAL 3 TIMES DAILY PRN
Status: DISCONTINUED | OUTPATIENT
Start: 2019-08-29 | End: 2019-08-31 | Stop reason: HOSPADM

## 2019-08-29 RX ORDER — ONDANSETRON 4 MG/1
4 TABLET, ORALLY DISINTEGRATING ORAL EVERY 6 HOURS PRN
Status: DISCONTINUED | OUTPATIENT
Start: 2019-08-29 | End: 2019-08-29 | Stop reason: HOSPADM

## 2019-08-29 RX ORDER — GABAPENTIN 100 MG/1
100 CAPSULE ORAL 3 TIMES DAILY PRN
Status: DISCONTINUED | OUTPATIENT
Start: 2019-08-29 | End: 2019-08-31 | Stop reason: HOSPADM

## 2019-08-29 RX ORDER — TRAZODONE HYDROCHLORIDE 50 MG/1
50 TABLET, FILM COATED ORAL
Status: DISCONTINUED | OUTPATIENT
Start: 2019-08-29 | End: 2019-08-31 | Stop reason: HOSPADM

## 2019-08-29 RX ORDER — QUETIAPINE FUMARATE 25 MG/1
25 TABLET, FILM COATED ORAL DAILY
Status: DISCONTINUED | OUTPATIENT
Start: 2019-08-29 | End: 2019-08-30

## 2019-08-29 RX ORDER — LAMOTRIGINE 25 MG/1
25 TABLET ORAL AT BEDTIME
Status: DISCONTINUED | OUTPATIENT
Start: 2019-08-29 | End: 2019-08-30

## 2019-08-29 RX ORDER — ACETAMINOPHEN 650 MG/1
650 SUPPOSITORY RECTAL EVERY 4 HOURS PRN
Status: DISCONTINUED | OUTPATIENT
Start: 2019-08-29 | End: 2019-08-29 | Stop reason: HOSPADM

## 2019-08-29 RX ORDER — QUETIAPINE FUMARATE 50 MG/1
50 TABLET, FILM COATED ORAL AT BEDTIME
Status: DISCONTINUED | OUTPATIENT
Start: 2019-08-29 | End: 2019-08-29 | Stop reason: HOSPADM

## 2019-08-29 RX ORDER — HYDROXYZINE HYDROCHLORIDE 25 MG/1
25 TABLET, FILM COATED ORAL EVERY 4 HOURS PRN
Status: DISCONTINUED | OUTPATIENT
Start: 2019-08-29 | End: 2019-08-29

## 2019-08-29 RX ORDER — QUETIAPINE FUMARATE 100 MG/1
100 TABLET, FILM COATED ORAL AT BEDTIME
Status: DISCONTINUED | OUTPATIENT
Start: 2019-08-29 | End: 2019-08-31 | Stop reason: HOSPADM

## 2019-08-29 RX ORDER — ALUMINA, MAGNESIA, AND SIMETHICONE 2400; 2400; 240 MG/30ML; MG/30ML; MG/30ML
30 SUSPENSION ORAL EVERY 4 HOURS PRN
Status: DISCONTINUED | OUTPATIENT
Start: 2019-08-29 | End: 2019-08-31 | Stop reason: HOSPADM

## 2019-08-29 RX ORDER — ACETAMINOPHEN 325 MG/1
650 TABLET ORAL EVERY 4 HOURS PRN
Status: DISCONTINUED | OUTPATIENT
Start: 2019-08-29 | End: 2019-08-29 | Stop reason: HOSPADM

## 2019-08-29 RX ORDER — DIVALPROEX SODIUM 500 MG/1
500 TABLET, EXTENDED RELEASE ORAL DAILY
Status: DISCONTINUED | OUTPATIENT
Start: 2019-08-29 | End: 2019-08-29 | Stop reason: HOSPADM

## 2019-08-29 RX ORDER — ACETAMINOPHEN 500 MG
500-1000 TABLET ORAL DAILY PRN
Status: ON HOLD | COMMUNITY
End: 2019-08-30

## 2019-08-29 RX ORDER — ACETAMINOPHEN 500 MG
500-1000 TABLET ORAL DAILY PRN
Status: DISCONTINUED | OUTPATIENT
Start: 2019-08-29 | End: 2019-08-31 | Stop reason: HOSPADM

## 2019-08-29 RX ORDER — LAMOTRIGINE 100 MG/1
100 TABLET ORAL 2 TIMES DAILY
Status: DISCONTINUED | OUTPATIENT
Start: 2019-08-29 | End: 2019-08-29

## 2019-08-29 RX ORDER — ONDANSETRON 2 MG/ML
4 INJECTION INTRAMUSCULAR; INTRAVENOUS EVERY 6 HOURS PRN
Status: DISCONTINUED | OUTPATIENT
Start: 2019-08-29 | End: 2019-08-29 | Stop reason: HOSPADM

## 2019-08-29 RX ORDER — SODIUM CHLORIDE 9 MG/ML
1000 INJECTION, SOLUTION INTRAVENOUS CONTINUOUS
Status: DISCONTINUED | OUTPATIENT
Start: 2019-08-29 | End: 2019-08-29

## 2019-08-29 RX ORDER — HYDROXYZINE HYDROCHLORIDE 25 MG/1
25 TABLET, FILM COATED ORAL
Status: ON HOLD | COMMUNITY
End: 2019-08-29

## 2019-08-29 RX ADMIN — LAMOTRIGINE 25 MG: 25 TABLET ORAL at 21:18

## 2019-08-29 RX ADMIN — DIVALPROEX SODIUM 500 MG: 500 TABLET, FILM COATED, EXTENDED RELEASE ORAL at 11:46

## 2019-08-29 RX ADMIN — GABAPENTIN 100 MG: 100 CAPSULE ORAL at 14:13

## 2019-08-29 RX ADMIN — SODIUM CHLORIDE 1000 ML: 9 INJECTION, SOLUTION INTRAVENOUS at 03:51

## 2019-08-29 RX ADMIN — GABAPENTIN 100 MG: 100 CAPSULE ORAL at 18:21

## 2019-08-29 RX ADMIN — QUETIAPINE FUMARATE 100 MG: 100 TABLET ORAL at 21:18

## 2019-08-29 RX ADMIN — SODIUM CHLORIDE 1000 ML: 9 INJECTION, SOLUTION INTRAVENOUS at 02:39

## 2019-08-29 RX ADMIN — GABAPENTIN 100 MG: 100 CAPSULE ORAL at 21:19

## 2019-08-29 RX ADMIN — QUETIAPINE FUMARATE 25 MG: 25 TABLET ORAL at 14:13

## 2019-08-29 ASSESSMENT — ACTIVITIES OF DAILY LIVING (ADL)
ORAL_HYGIENE: INDEPENDENT
SWALLOWING: 0-->SWALLOWS FOODS/LIQUIDS WITHOUT DIFFICULTY
RETIRED_COMMUNICATION: 0-->UNDERSTANDS/COMMUNICATES WITHOUT DIFFICULTY
DRESS: INDEPENDENT
HYGIENE/GROOMING: INDEPENDENT
TRANSFERRING: 0-->INDEPENDENT
FALL_HISTORY_WITHIN_LAST_SIX_MONTHS: NO
TOILETING: 0-->INDEPENDENT
AMBULATION: 0-->INDEPENDENT
COGNITION: 0 - NO COGNITION ISSUES REPORTED
BATHING: 0-->INDEPENDENT
DRESS: 0-->INDEPENDENT
RETIRED_EATING: 0-->INDEPENDENT
LAUNDRY: UNABLE TO COMPLETE

## 2019-08-29 ASSESSMENT — MIFFLIN-ST. JEOR: SCORE: 1720.67

## 2019-08-29 NOTE — PROGRESS NOTES
Dr. Lebron was here to see pt and said 1:1 could be discontinued.  Pt was called over to inpt. Psych. And pt states she is willing to go.

## 2019-08-29 NOTE — PROGRESS NOTES
08/29/19 1421   Patient Belongings   Did you bring any home meds/supplements to the hospital?  No   Patient Belongings locker   Patient Belongings Sent Home other (see comments)   Patient Belongings Put in Hospital Secure Location (Security or Locker, etc.) other (see comments)   Belongings Search Yes   Clothing Search Yes   Second Staff Charis (3B), Matias TRIVEDI     In locker:  1 black & white tank top, 1 red & white shirt, 1 green tank top, 1 gray cardigan, 1 black cardigan, 1 single beige & pink sock, 1 single gray & blue sock, 1 pair white socks, 1 pair rainbow socks, 1 pair orange socks, 2 gray shirts, 4 pairs underwear, 2 bras, 1 tan tote bag full of papers & debris, 1 tan wallet, 1 iPhone, 1 set wireless earbuds, 1 pair sandals.    In security envelope #[id number]:  1 [employer] card #[id number], 1 [employer] bank debit #[id number], 1 [employer] card #[id number], 1 [employer] #[id number]    Pt belongings brought in on 8/30/19: black leggings, black and white shirt, grey sweater    A               Admission:  I am responsible for any personal items that are not sent to the safe or pharmacy.  [facility] is not responsible for loss, theft or damage of any property in my possession.    Signature:  _________________________________ Date: _______  Time: _____                                              Staff Signature:  ____________________________ Date: ________  Time: _____      2nd Staff person, if patient is unable/unwilling to sign:    Signature: ________________________________ Date: ________  Time: _____     Discharge:  [facility] has returned all of my personal belongings:    Signature: _________________________________ Date: ________  Time: _____                                          Staff Signature:  ____________________________ Date: ________  Time: _____
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
## 2019-08-29 NOTE — ED PROVIDER NOTES
"  History     Chief Complaint   Patient presents with     Drug Overdose     Seen Psychiartrist today and MD said she should be admitted. Patient refused. Went home and felt lost. Took half a bottle of sleeping pill-Hydroxyzine, 5 Ativan 2mg each around 2115.     CHRISTY Lieberman is a 23 year old female with a history of depression, bipolar 1 disorder, and schizoaffective disorder who presents for evaluation after overdosing on her medications. The patient reports she took 16 pills of hydroxyzine (unknown dosage) and 5-6 pills of 2 mg Ativan tonight around 9:15 PM. She states she was trying to kill herself and has been thinking about doing it \"for awhile\", especially after her recent diagnoses of bipolar and schizoaffective disorders. Patient reports she's been seeing bugs on herself \"all the time\". Of note, the patient met with her psychiatrist today and her psychiatrist had tried to admit the patient. The patient denies taking any other medications tonight.     Past Medical History:   Diagnosis Date     Bipolar 1 disorder (H)      Depressive disorder      H/O magnetic resonance imaging of brain and brain stem 6/15/2017    MRI BRAIN [facility] CON4/21/2017 [facility] Result Impression IMPRESSION: Chronic sequela of remote neurocysticercosis at the right middle frontal gyrus, stable. Calcification seen left anterior temporal lobe, better seen on prior CT. No new intracranial lesion identified. Result Narrative EXAM: MRI BRAIN WITHOUT AND WITH CONTRAST, 4/21/2017  CLINICAL DATA: History of neurocysticercosi     Neurocysticercosis 2006     Schizo affective schizophrenia (H)        History reviewed. No pertinent surgical history.    Family History   Problem Relation Age of Onset     Hypertension Mother      Diabetes Mother      Depression Mother      Lipids Mother      Asthma Brother      Cancer - colorectal Other 13        colon cancer     Thyroid Disease No family hx of      Heart Disease No family hx " of      Unknown/Adopted No family hx of      Family History Negative No family hx of      C.A.D. No family hx of      Cerebrovascular Disease No family hx of      Breast Cancer No family hx of      Prostate Cancer No family hx of      Alcohol/Drug No family hx of      Allergies No family hx of      Alzheimer Disease No family hx of      Anesthesia Reaction No family hx of      Arthritis No family hx of      Blood Disease No family hx of      Cancer No family hx of      Cardiovascular No family hx of      Circulatory No family hx of      Congenital Anomalies No family hx of      Connective Tissue Disorder No family hx of      Endocrine Disease No family hx of      Eye Disorder No family hx of      Genetic Disorder No family hx of      Gastrointestinal Disease No family hx of      Genitourinary Problems No family hx of      Gynecology No family hx of      Musculoskeletal Disorder No family hx of      Neurologic Disorder No family hx of      Obesity No family hx of      Osteoporosis No family hx of      Psychotic Disorder No family hx of      Respiratory No family hx of      Hearing Loss No family hx of        Social History     Tobacco Use     Smoking status: Never Smoker     Smokeless tobacco: Never Used   Substance Use Topics     Alcohol use: Yes       Current Facility-Administered Medications   Medication     0.9% sodium chloride BOLUS    Followed by     sodium chloride 0.9% infusion     Current Outpatient Medications   Medication     lamoTRIgine (LAMICTAL) 100 MG tablet     LORazepam (ATIVAN) 2 MG tablet     QUEtiapine (SEROQUEL) 100 MG tablet     QUEtiapine (SEROQUEL) 25 MG tablet     ibuprofen (ADVIL/MOTRIN) 800 MG tablet        Allergies   Allergen Reactions     Bupropion      Other reaction(s): Seizures  Per patient(Wellbutrin )     Amoxicillin      High fever and hives     Penicillins Other (See Comments)     Increase BP and fever.     Levetiracetam Anxiety     Other reaction(s): Behavioral Disturbances    Developed worsening depression, reduced appetite   isturbances (Keppra)     I have reviewed the Medications, Allergies, Past Medical and Surgical History, and Social History in the Epic system.    Review of Systems   Constitutional: Positive for fatigue.   All other systems reviewed and are negative.      Physical Exam   BP: 129/76  Pulse: 86  Temp: 98.5  F (36.9  C)  Resp: 16  Weight: 101.6 kg (224 lb)  SpO2: 97 %      Physical Exam   Constitutional: She is oriented to person, place, and time. She appears well-developed and well-nourished. No distress.   HENT:   Head: Atraumatic.   Mouth/Throat: Oropharynx is clear and moist. No oropharyngeal exudate.   Eyes: Pupils are equal, round, and reactive to light. No scleral icterus.   Cardiovascular: Normal rate, regular rhythm, normal heart sounds and intact distal pulses.   Pulmonary/Chest: Breath sounds normal. No respiratory distress.   Abdominal: Soft. Bowel sounds are normal. There is no tenderness.   Musculoskeletal: She exhibits no edema or tenderness.   Neurological: She is alert and oriented to person, place, and time. She displays normal reflexes. No cranial nerve deficit. Coordination normal.   Skin: Skin is warm. No rash noted. She is not diaphoretic.   Nursing note and vitals reviewed.      ED Course        Procedures       10:35 PM  The patient was seen and examined by Dr. Hyman in Room 11.          EKG Interpretation:      Interpreted by Jeet Hyman MD  Time reviewed: none  Symptoms at time of EKG: per Epic   Rhythm: normal sinus   Rate: normal  Axis: normal  Ectopy: none  Conduction: normal  ST Segments/ T Waves: No ST-T wave changes  Q Waves: none  Comparison to prior: Unchanged    Clinical Impression: normal EKG          Critical Care time:  none             Labs Ordered and Resulted from Time of ED Arrival Up to the Time of Departure from the ED - No data to display         Assessments & Plan (with Medical Decision Making)     23 year old female  with a history of depression, bipolar 1 disorder, and schizoaffective disorder who presents for evaluation after overdosing on her medications.  Patient reports taking Ativan 10 mg by mouth and 5 divided tablets around 9 PM along with 16 tablets of hydroxyzine with unknown dosages at the same time.  She denies any other coingestions.  Patient placed on a cardiac monitor which revealed normal sinus rhythm and stable vital signs with pulse ox in the 98% on room air.  IV established, labs drawn sent reviewed document in epic essentially all unremarkable including negative acetaminophen and salicylate and alcohol levels, normal electrolytes, normal CBC, normal UDS.  EKG obtained which revealed normal sinus rhythm with normal intervals and unchanged from prior.  Case discussed with poison control who states that her benzodiazepines should be peaked about 2 hours postingestion and antihistamines should peak between 3 and 4 hours postingestion.  Patient monitored during these intervals and remained somnolent but arousable.  Case discussed with hospitalist who will admit overnight while patient metabolizes her coingestion with plan to transfer to [facility] for inpatient admission as recommended by her outpatient psychiatrist.    I have reviewed the nursing notes.    I have reviewed the findings, diagnosis, plan and need for follow up with the patient.    New Prescriptions    No medications on file       Final diagnoses:   Overdose, intentional self-harm, initial encounter (H)   IItz, am serving as a trained medical scribe to document services personally performed by Jeet Hyman MD, based on the provider's statements to me.   Jeet MCNAMARA MD, was physically present and have reviewed and verified the accuracy of this note documented by Itz Winslow.    8/28/2019   [facility], EMERGENCY DEPARTMENT     Jeet Hyman MD  08/29/19 0056

## 2019-08-29 NOTE — PHARMACY-ADMISSION MEDICATION HISTORY
Admission medication history interview status for the 8/28/2019 admission is complete. See Epic admission navigator for allergy information, pharmacy, prior to admission medications and immunization status.     Medication history interview sources:  Patient and [facility] pharmacy in [place], [place]    Changes made to PTA medication list (reason)  Added:   -Acetaminophen: Patient takes this over-the-counter.  -Hydroxyzine: Added per [facility] fill history.     Deleted:   -Ibuprofen: Patient was taking this over-the-counter and is no longer taking.     Changed:   -Lorazepam: Changed from 2 mg q6h --> q24h per [facility] fill history.  -Seroquel: Changed from 25 mg daily and 75 mg at bedtime --> 25 mg daily and 100 mg at bedtime per [facility] fill history.     Additional medication history information (including reliability of information, actions taken by pharmacist):  -Lamotrigine: Per [facility], patient picked up the prescription on 8/28/2019, but the patient states that her last dose was over 2 weeks ago. When asked, she cannot remember the exact date of her last dose.   -Hydroxyzine: Per [facility], the patient picked this up a couple of months ago, April 2019.     Prior to Admission medications    Medication Sig Last Dose Taking? Auth Provider   lamoTRIgine (LAMICTAL) 100 MG tablet Take 100 mg by mouth 2 times daily Past Month at Unknown time Yes Unknown, Entered By History   LORazepam (ATIVAN) 2 MG tablet Take 2 mg by mouth daily as needed for anxiety  8/28/2019 at PM Yes Reported, Patient   acetaminophen (TYLENOL) 500 MG tablet Take 500-1,000 mg by mouth daily as needed (head ache) Unknown at Unknown time  Unknown, Entered By History   hydrOXYzine (ATARAX) 25 MG tablet Take 25 mg by mouth nightly as needed (sleep) Patient picked up last dose in April 2019. Unknown at Unknown time  Unknown, Entered By History   QUEtiapine (SEROQUEL) 100 MG tablet Take 100 mg by mouth At Bedtime  Unknown at Unknown time  Reported,  Patient   QUEtiapine (SEROQUEL) 25 MG tablet Take 25 mg by mouth daily Unknown at Unknown time  Reported, Patient     Medication history completed by: Gabby Gray; [employer] Student Pharmacist

## 2019-08-29 NOTE — ED NOTES
[facility], [place]   ED Nurse to Floor Handoff     Libra Lieberman is a 23 year old female who speaks English and lives with a spouse,  in a home  They arrived in the ED by car from home    ED Chief Complaint: Drug Overdose (Seen Psychiartrist today and MD said she should be admitted. Patient refused. Went home and felt lost. Took half a bottle of sleeping pill-Hydroxyzine, 5 Ativan 2mg each around 2115.)    ED Dx;   Final diagnoses:   Overdose, intentional self-harm, initial encounter (H)         Needed?: No    Allergies:   Allergies   Allergen Reactions     Bupropion      Other reaction(s): Seizures  Per patient(Wellbutrin )     Amoxicillin      High fever and hives     Penicillins Other (See Comments)     Increase BP and fever.     Levetiracetam Anxiety     Other reaction(s): Behavioral Disturbances   Developed worsening depression, reduced appetite   isturbances (Keppra)   .  Past Medical Hx:   Past Medical History:   Diagnosis Date     Bipolar 1 disorder (H)      Depressive disorder      H/O magnetic resonance imaging of brain and brain stem 6/15/2017    MRI BRAIN [facility] CON4/21/2017 [facility] Result Impression IMPRESSION: Chronic sequela of remote neurocysticercosis at the right middle frontal gyrus, stable. Calcification seen left anterior temporal lobe, better seen on prior CT. No new intracranial lesion identified. Result Narrative EXAM: MRI BRAIN WITHOUT AND WITH CONTRAST, 4/21/2017  CLINICAL DATA: History of neurocysticercosi     Neurocysticercosis 2006     Schizo affective schizophrenia (H)       Baseline Mental status: WDL  Current Mental Status changes: at basesline    Infection present or suspected this encounter: no  Sepsis suspected: No  Isolation type: No active isolations     Activity level - Baseline/Home:  Independent  Activity Level - Current:   Independent    Bariatric equipment needed?: No    In the ED these meds were given:    Medications   0.9% sodium chloride BOLUS (1,000 mLs Intravenous New Bag 8/28/19 7773)     Followed by   sodium chloride 0.9% infusion (has no administration in time range)       Drips running?  Yes    Home pump  No    Current LDAs  Peripheral IV 08/28/19 Right (Active)   Site Assessment WDL 8/28/2019 10:57 PM   Number of days: 0       Labs results:   Labs Ordered and Resulted from Time of ED Arrival Up to the Time of Departure from the ED   GLUCOSE BY METER - Abnormal; Notable for the following components:       Result Value    Glucose 120 (*)     All other components within normal limits   DRUG ABUSE SCREEN 6 CHEM DEP URINE ([facility])   CBC WITH PLATELETS DIFFERENTIAL   COMPREHENSIVE METABOLIC PANEL   GLUCOSE MONITOR NURSING POCT   ALCOHOL ETHYL   ACETAMINOPHEN LEVEL   SALICYLATE LEVEL   HCG QUALITATIVE URINE   PERIPHERAL IV CATHETER   CARDIAC CONTINUOUS MONITORING       Imaging Studies: No results found for this or any previous visit (from the past 24 hour(s)).    Recent vital signs:   /60   Pulse 76   Temp 98.5  F (36.9  C) (Oral)   Resp 25   Wt 101.6 kg (224 lb)   LMP 08/25/2019   SpO2 98%   Breastfeeding? No   BMI 39.68 kg/m              Cardiac Rhythm: Normal Sinus  Pt needs tele? Yes  Skin/wound Issues: None    Code Status: Full Code    Pain control: pt had none    Nausea control: pt had none    Abnormal labs/tests/findings requiring intervention: Tele, IVF    Family present during ED course? No   Family Comments/Social Situation comments: PT lives with spouse. Pt c/o being suicidal with taking an overdose tonight.    Tasks needing completion: None    PAMELA CHRISTIE RN  [facility] -- [phone] [facility]  [phone] [facility] ED
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
## 2019-08-29 NOTE — PLAN OF CARE
"ADMIT: This 24 yo Pt overdosed yesterday after seeing her OP psychiatrist and her psychiatrist recommended IP  hospitalization. Pt did not want hospitalization and went home and overdosed on 16 hydroxyzine tablets (unknown dosage), 5-6 tablets Ativan 2 mg. Recently diagnosed with bipolar/schizoaffective disorder. Pt has not been med compliant. This is patient's first overdose attempt and hospitalization. Upon arrival, pt tearful stating she just wants to go home. When asked why the overdose, she refused to answer. Pt stated her life is perfect, \"my  is great\" when asked about her home life. Pt also has a daughter. Pt has had a great deal of anxiety so a 25 mg seroquel and 100mg Neurontin was given po at 1415 today.  Pt is ambulating, voiding, drinking fluids. Pt has been medically cleared by MD and poison control per their recommendations. Labs unremarkable. UDS negative.     A: Voluntary.  "

## 2019-08-29 NOTE — PLAN OF CARE
VS:     Pt A/O X 4. Afebrile. BP soft. SBP in 90s. IV fluids running at 125ml/hr until discontinued by MD at 1145. Lungs- clear and equal bilaterally. Denies nausea, shortness of breath, and chest pain. Pt reported suicidal ideations. Denies hallucinations this shift. 1:1 discontinued at 1145.      Output:     Bowels- active in all four quadrants. Voiding frequently. LBM 8/28.      Activity:     Pt up independently. Ambulating in room.      Skin: Intact.     Pain:     Denies physical pain.       CMS:     CMS and Neuro's are intact. Denies numbness and tingling in all extremities.      Diet:     Pt is on a Regular diet. Pt stated that she has no appetite. BG checked - 79. Encouraged to order breakfast. Pt ate 100% of meal.      LDA:     PIVs removed prior to discharge.      Plan:     Pt transferred to Adult . Report given to Faustino MINOR on Adult .       Additional Info:     Pt seen by Dr. Lebron prior to transfer.

## 2019-08-29 NOTE — TELEPHONE ENCOUNTER
1122: Reviewed case with Gloria  -she will complete D2D and call back to confirm dispo  -she will also confirm it pt will need [facility] HH or not    1131: Reviewed case with Renita, Nurse Manager  -she approves transfer if Gloria accepts after D2D    1140: Spoke with Gloria  -D2D is complete  -Dr. Bautista & Dr. Lebron (completed consult) both confirmed pt was notified of what a  admit looks like and confirm pt is willing and voluntary for admission      Pt  accepted for [facility]/Ruth(Gloria)

## 2019-08-29 NOTE — PLAN OF CARE
A&O x4. Pt somnolent. Pt continues to have low BPs (see below.) Pt denied any new concerns overnight. IV fluids running and PIV patent during infusion. Pt bedside attendant in room for continuous monitoring this shift. Continue with plan of care.    [id number]  -diagnostic tests and consults completed and resulted - complete  -vital signs normal or at patient baseline - BP 91/52 otherwise VSS  -tolerating oral intake to maintain hydration - pt sleeping and no oral intake  -returns to baseline functional status - pt sleeping.    [id number]  -diagnostic tests and consults completed and resulted - complete  -vital signs normal or at patient baseline - BP 99/54 otherwise VSS  -tolerating oral intake to maintain hydration - pt sleeping and no oral intake  -returns to baseline functional status - pt sleeping.    [id number]  -diagnostic tests and consults completed and resulted - complete  -vital signs normal or at patient baseline - BP 91/49 otherwise VSS  -tolerating oral intake to maintain hydration - pt sleeping and no oral intake  -returns to baseline functional status - pt ambulated to bathroom with standby assist. Pt reported no dizziness on ambulation.

## 2019-08-29 NOTE — DISCHARGE SUMMARY
"[facility], [place]  Hospitalist Discharge Summary       Date of Admission:  8/28/2019  Date of Discharge:  8/29/2019  Discharging Provider: Michele Duncan MD      Discharge Diagnoses   Major depression  Intentional medication overdose         Discharge Disposition   Transferred to Inpatient mental Health unit   Condition at discharge: Stable    Hospital Course    Libra Lieberman is a 23 year old female admitted on 8/28/2019. She has a PMH significant for depression, bipolar disorder and schizoaffective disorder who presented to the ED via personal vehicle after intentional ingestion of hydroxyzine and ativan as suicide attempt. She is admitted to medicine for observation of respiratory and neurologic status after intentional overdose with likely plan for inpatient psychiatry once medically cleared.      #Suicide Attempt  #Overdose  -Reports ingesting 16 tabs of hydroxyzine and 5-6 tabs of Ativan 2mg at 9:15pm on 8/28  -BMP wnl, EKG wnl, UDS negative, APAP/ASA/EtOH levels neg  -Poison control called from ED and recommend peak effect of ativan should have been 2 hours after ingestion and peak effect of hydroxyzine should have been 3 hours after ingestion.   Dr Branham personally called poison control to discuss further management. No other active recommendations aside from monitoring neurologic and respiratory status.    Stable for transfer to inpatient psych unit     #Suicidal ideation  -1:1 Suicide precautions  -Recommend evaluation for inpatient psychiatry once medically cleared from overdose     #Depression  #Bipolar disorder  #Schizoaffective disorder  -Not taking prior medications of Celexa, Lorazepam on a regular basis for several months  -Prescribed Quetiapine as new medication at Psychiatry outpatient visit on 8/28, but patient reports not starting this yet       #Hx of Neurocysticercosis  -3/2018 MR brain noted to have \"incidental extra-axial right frontal cystic " "abnormality of doubtful clinical significance.\" Per chart review, diagnosed in 2010 and ID recommended no treatment of neurocysticercosis. Serology by [facility] and immunoblot at [facility] both negative  -Continue lamotrigine per home dosing           Consultations This Hospital Stay   SOCIAL WORK IP CONSULT  PSYCHIATRY IP CONSULT    Code Status   Full Code    Time Spent on this Encounter   IMichele MD, personally saw the patient today and spent greater than 30 minutes discharging this patient.       Michele Duncan MD  [facility], [place]  ______________________________________________________________________    Physical Exam   Vital Signs: Temp: 98.9  F (37.2  C) Temp src: Oral BP: 90/55 Pulse: 79 Heart Rate: 88 Resp: 16 SpO2: 99 % O2 Device: None (Room air)    Weight: 227 lbs 0 oz  General Appearance: Awake, alert and orientated. Flat affect   Respiratory: Clear breath sounds bilaterally   Cardiovascular: Normal heart sounds   GI: Soft, non tender, normal bowel sounds   Skin: No bruising / bleeding   Other:  awake, alert and orientated X 3. No defecits        Primary Care Physician   Dinah Smith    Discharge Orders      Reason for your hospital stay    Suicidal ideation  Intentional drug overdose     Activity - Up ad marti     Full Code     Advance Diet as Tolerated    Follow this diet upon discharge: Orders Placed This Encounter      Regular Diet Adult       Significant Results and Procedures   Results for orders placed or performed during the hospital encounter of 06/15/17   CT Head w/o Contrast    Narrative    CT HEAD W/O CONTRAST 6/15/2017 2:18 PM    Provided History: Neurocysticercosis.    Comparison: None available.    Technique: Using multidetector thin collimation helical acquisition  technique, axial, coronal and sagittal CT images from the skull base  to the vertex were obtained without intravenous contrast.     Findings:    Cystic focus in the " anterior right frontal lobe measuring 1.3 x 1.1 cm  with a punctate internal 2 mm hyperdensity, and no adjacent vasogenic  edema. 4 mm calcification in the anterior left temporal lobe. No  intracranial hemorrhage, mass effect, or midline shift. The ventricles  are proportionate to the cerebral sulci. The gray to white matter  differentiation of the cerebral hemispheres is preserved. The basal  cisterns are patent.    The visualized paranasal sinuses are clear. The mastoid air cells are  clear.       Impression    Impression:   Neurocysticercosis with evidence of vesicular and chronic calcified  stages.    I have personally reviewed the examination and initial interpretation  and I agree with the findings.    TIM ANDERSON MD       Discharge Medications   Current Discharge Medication List      CONTINUE these medications which have NOT CHANGED    Details   lamoTRIgine (LAMICTAL) 100 MG tablet Take 100 mg by mouth 2 times daily      acetaminophen (TYLENOL) 500 MG tablet Take 500-1,000 mg by mouth daily as needed (head ache)      !! QUEtiapine (SEROQUEL) 100 MG tablet Take 100 mg by mouth At Bedtime       !! QUEtiapine (SEROQUEL) 25 MG tablet Take 25 mg by mouth daily       !! - Potential duplicate medications found. Please discuss with provider.      STOP taking these medications       hydrOXYzine (ATARAX) 25 MG tablet Comments:   Reason for Stopping:         LORazepam (ATIVAN) 2 MG tablet Comments:   Reason for Stopping:             Allergies   Allergies   Allergen Reactions     Bupropion      Other reaction(s): Seizures  Per patient(Wellbutrin )     Amoxicillin      High fever and hives     Penicillins Other (See Comments)     Increase BP and fever.     Levetiracetam Anxiety     Other reaction(s): Behavioral Disturbances   Developed worsening depression, reduced appetite   isturbances (Keppra)

## 2019-08-30 VITALS
TEMPERATURE: 98.9 F | HEART RATE: 98 BPM | HEIGHT: 61 IN | DIASTOLIC BLOOD PRESSURE: 67 MMHG | SYSTOLIC BLOOD PRESSURE: 108 MMHG | OXYGEN SATURATION: 99 % | RESPIRATION RATE: 16 BRPM | WEIGHT: 227.8 LBS | BODY MASS INDEX: 43.01 KG/M2

## 2019-08-30 LAB
ALBUMIN SERPL-MCNC: 3.3 G/DL (ref 3.4–5)
ALP SERPL-CCNC: 72 U/L (ref 40–150)
ALT SERPL W P-5'-P-CCNC: 22 U/L (ref 0–50)
ANION GAP SERPL CALCULATED.3IONS-SCNC: 7 MMOL/L (ref 3–14)
AST SERPL W P-5'-P-CCNC: 13 U/L (ref 0–45)
BASOPHILS # BLD AUTO: 0 10E9/L (ref 0–0.2)
BASOPHILS NFR BLD AUTO: 0.3 %
BILIRUB SERPL-MCNC: 0.4 MG/DL (ref 0.2–1.3)
BUN SERPL-MCNC: 9 MG/DL (ref 7–30)
CALCIUM SERPL-MCNC: 8.2 MG/DL (ref 8.5–10.1)
CHLORIDE SERPL-SCNC: 108 MMOL/L (ref 94–109)
CHOLEST SERPL-MCNC: 121 MG/DL
CO2 SERPL-SCNC: 27 MMOL/L (ref 20–32)
CREAT SERPL-MCNC: 0.53 MG/DL (ref 0.52–1.04)
DIFFERENTIAL METHOD BLD: NORMAL
EOSINOPHIL # BLD AUTO: 0.1 10E9/L (ref 0–0.7)
EOSINOPHIL NFR BLD AUTO: 0.9 %
ERYTHROCYTE [DISTWIDTH] IN BLOOD BY AUTOMATED COUNT: 12.4 % (ref 10–15)
GFR SERPL CREATININE-BSD FRML MDRD: >90 ML/MIN/{1.73_M2}
GLUCOSE SERPL-MCNC: 89 MG/DL (ref 70–99)
HCG UR QL: NEGATIVE
HCT VFR BLD AUTO: 40.8 % (ref 35–47)
HDLC SERPL-MCNC: 35 MG/DL
HGB BLD-MCNC: 13.4 G/DL (ref 11.7–15.7)
IMM GRANULOCYTES # BLD: 0 10E9/L (ref 0–0.4)
IMM GRANULOCYTES NFR BLD: 0.1 %
LDLC SERPL CALC-MCNC: 57 MG/DL
LYMPHOCYTES # BLD AUTO: 2.6 10E9/L (ref 0.8–5.3)
LYMPHOCYTES NFR BLD AUTO: 38 %
MCH RBC QN AUTO: 29.3 PG (ref 26.5–33)
MCHC RBC AUTO-ENTMCNC: 32.8 G/DL (ref 31.5–36.5)
MCV RBC AUTO: 89 FL (ref 78–100)
MONOCYTES # BLD AUTO: 0.4 10E9/L (ref 0–1.3)
MONOCYTES NFR BLD AUTO: 6.4 %
NEUTROPHILS # BLD AUTO: 3.8 10E9/L (ref 1.6–8.3)
NEUTROPHILS NFR BLD AUTO: 54.3 %
NONHDLC SERPL-MCNC: 86 MG/DL
NRBC # BLD AUTO: 0 10*3/UL
NRBC BLD AUTO-RTO: 0 /100
PLATELET # BLD AUTO: 198 10E9/L (ref 150–450)
POTASSIUM SERPL-SCNC: 3.8 MMOL/L (ref 3.4–5.3)
PROT SERPL-MCNC: 6.9 G/DL (ref 6.8–8.8)
RBC # BLD AUTO: 4.57 10E12/L (ref 3.8–5.2)
SODIUM SERPL-SCNC: 142 MMOL/L (ref 133–144)
TRIGL SERPL-MCNC: 145 MG/DL
TSH SERPL DL<=0.005 MIU/L-ACNC: 0.92 MU/L (ref 0.4–4)
WBC # BLD AUTO: 6.9 10E9/L (ref 4–11)

## 2019-08-30 PROCEDURE — 25000132 ZZH RX MED GY IP 250 OP 250 PS 637: Performed by: CLINICAL NURSE SPECIALIST

## 2019-08-30 PROCEDURE — 84443 ASSAY THYROID STIM HORMONE: CPT | Performed by: CLINICAL NURSE SPECIALIST

## 2019-08-30 PROCEDURE — 80053 COMPREHEN METABOLIC PANEL: CPT | Performed by: CLINICAL NURSE SPECIALIST

## 2019-08-30 PROCEDURE — 80061 LIPID PANEL: CPT | Performed by: CLINICAL NURSE SPECIALIST

## 2019-08-30 PROCEDURE — G0177 OPPS/PHP; TRAIN & EDUC SERV: HCPCS

## 2019-08-30 PROCEDURE — 12400001 ZZH R&B MH UMMC

## 2019-08-30 PROCEDURE — 99207 ZZC CDG-CODE CATEGORY CHANGED: CPT | Performed by: CLINICAL NURSE SPECIALIST

## 2019-08-30 PROCEDURE — 99234 HOSP IP/OBS SM DT SF/LOW 45: CPT | Performed by: CLINICAL NURSE SPECIALIST

## 2019-08-30 PROCEDURE — H2032 ACTIVITY THERAPY, PER 15 MIN: HCPCS

## 2019-08-30 PROCEDURE — 85025 COMPLETE CBC W/AUTO DIFF WBC: CPT | Performed by: CLINICAL NURSE SPECIALIST

## 2019-08-30 PROCEDURE — 81025 URINE PREGNANCY TEST: CPT | Performed by: CLINICAL NURSE SPECIALIST

## 2019-08-30 PROCEDURE — 36415 COLL VENOUS BLD VENIPUNCTURE: CPT | Performed by: CLINICAL NURSE SPECIALIST

## 2019-08-30 RX ORDER — GABAPENTIN 100 MG/1
100 CAPSULE ORAL 3 TIMES DAILY PRN
Qty: 90 CAPSULE | Refills: 0 | Status: SHIPPED | OUTPATIENT
Start: 2019-08-30

## 2019-08-30 RX ORDER — LAMOTRIGINE 25 MG/1
25 TABLET ORAL AT BEDTIME
Qty: 12 TABLET | Refills: 0 | Status: SHIPPED | OUTPATIENT
Start: 2019-08-30

## 2019-08-30 RX ORDER — LITHIUM CARBONATE 300 MG/1
300 TABLET, FILM COATED, EXTENDED RELEASE ORAL AT BEDTIME
Qty: 30 TABLET | Refills: 0 | Status: SHIPPED | OUTPATIENT
Start: 2019-08-30

## 2019-08-30 RX ORDER — LAMOTRIGINE 25 MG/1
50 TABLET ORAL DAILY
Status: DISCONTINUED | OUTPATIENT
Start: 2019-09-12 | End: 2019-08-31

## 2019-08-30 RX ORDER — LITHIUM CARBONATE 300 MG/1
300 TABLET, FILM COATED, EXTENDED RELEASE ORAL AT BEDTIME
Status: DISCONTINUED | OUTPATIENT
Start: 2019-08-30 | End: 2019-08-31 | Stop reason: HOSPADM

## 2019-08-30 RX ORDER — LAMOTRIGINE 25 MG/1
50 TABLET ORAL DAILY
Qty: 14 TABLET | Refills: 0 | Status: SHIPPED | OUTPATIENT
Start: 2019-09-12

## 2019-08-30 RX ORDER — QUETIAPINE FUMARATE 100 MG/1
100 TABLET, FILM COATED ORAL AT BEDTIME
Qty: 30 TABLET | Refills: 0 | Status: SHIPPED | OUTPATIENT
Start: 2019-08-30

## 2019-08-30 RX ADMIN — ACETAMINOPHEN 1000 MG: 500 TABLET ORAL at 16:23

## 2019-08-30 RX ADMIN — QUETIAPINE FUMARATE 25 MG: 25 TABLET ORAL at 08:53

## 2019-08-30 RX ADMIN — GABAPENTIN 100 MG: 100 CAPSULE ORAL at 08:53

## 2019-08-30 RX ADMIN — GABAPENTIN 100 MG: 100 CAPSULE ORAL at 18:36

## 2019-08-30 RX ADMIN — MAGNESIUM HYDROXIDE 30 ML: 400 SUSPENSION ORAL at 22:29

## 2019-08-30 RX ADMIN — GABAPENTIN 100 MG: 100 CAPSULE ORAL at 12:33

## 2019-08-30 RX ADMIN — QUETIAPINE FUMARATE 100 MG: 100 TABLET ORAL at 22:29

## 2019-08-30 RX ADMIN — LITHIUM CARBONATE 300 MG: 300 TABLET, EXTENDED RELEASE ORAL at 22:29

## 2019-08-30 ASSESSMENT — ACTIVITIES OF DAILY LIVING (ADL)
DRESS: INDEPENDENT
HYGIENE/GROOMING: INDEPENDENT
LAUNDRY: UNABLE TO COMPLETE
ORAL_HYGIENE: INDEPENDENT
DRESS: INDEPENDENT
ORAL_HYGIENE: INDEPENDENT
HYGIENE/GROOMING: INDEPENDENT

## 2019-08-30 NOTE — DISCHARGE SUMMARY
Psychiatric Discharge Summary    Libra Lieberman MRN# [id number]   Age: 23 year old YOB: 1995     Date of Admission:  8/29/2019  Date of Discharge:  8/31/2019 11:19 AM  Admitting Physician:  Jj Miranda MD  Discharge Physician:  Debra A. Naegele, APRN CNS (Contact: [phone])         Event Leading to Hospitalization:   Reema Mcknight is a 23-year-old  female with one small child presenting post-overdose attempt on 16 tabs of hydroxyzine and 5 to 6 tabs of Ativan.  The patient was stabilized on medical unit 10A and transferred to unit 4A for further psychiatric treatment.  The patient states that she has been under a lot of stress after receiving a diagnosis of schizoaffective disorder.  The patient reports that she has been having issues seeing bugs on herself all the time. Patient reports fall is a bad time of year for her because her birthday and her grandfather's birthday are celebrated together.       The patient states she was diagnosed in 2010 with neurocysticercosis.  ID recommended no treatment of neurocysticercosis.  Serology by DARWIN immunoblot at [facility] both negative.   Out patient provider ordered testing to determine if there was an organic reason for her adal and visual hallucinations. The patient reports since starting Seroquel she has not been seeing bugs on her skin.  She feels better, but also feels sedated.  The patient's goal for this hospitalization is medication adjustment and therapy.        See Admission note by  Debra Ann, Naegele APRN CNS on 8/30/2019 for additional details.          DIagnoses:   1.  Schizoaffective disorder, bipolar type.   2.  Posttraumatic stress disorder.   3.  S/P 16 tabs of hydroxyzine and 5 to 6 tabs of Ativan  4.  History of neurocysticercosis         Labs:     Results for orders placed or performed during the hospital encounter of 08/29/19   TSH with free T4 reflex and/or T3 as indicated   Result Value Ref Range    TSH 0.92 0.40 -  4.00 mU/L   Lipid panel   Result Value Ref Range    Cholesterol 121 <200 mg/dL    Triglycerides 145 <150 mg/dL    HDL Cholesterol 35 (L) >49 mg/dL    LDL Cholesterol Calculated 57 <100 mg/dL    Non HDL Cholesterol 86 <130 mg/dL   Comprehensive metabolic panel   Result Value Ref Range    Sodium 142 133 - 144 mmol/L    Potassium 3.8 3.4 - 5.3 mmol/L    Chloride 108 94 - 109 mmol/L    Carbon Dioxide 27 20 - 32 mmol/L    Anion Gap 7 3 - 14 mmol/L    Glucose 89 70 - 99 mg/dL    Urea Nitrogen 9 7 - 30 mg/dL    Creatinine 0.53 0.52 - 1.04 mg/dL    GFR Estimate >90 >60 mL/min/[1.73_m2]    GFR Estimate If Black >90 >60 mL/min/[1.73_m2]    Calcium 8.2 (L) 8.5 - 10.1 mg/dL    Bilirubin Total 0.4 0.2 - 1.3 mg/dL    Albumin 3.3 (L) 3.4 - 5.0 g/dL    Protein Total 6.9 6.8 - 8.8 g/dL    Alkaline Phosphatase 72 40 - 150 U/L    ALT 22 0 - 50 U/L    AST 13 0 - 45 U/L   CBC with platelets differential   Result Value Ref Range    WBC 6.9 4.0 - 11.0 10e9/L    RBC Count 4.57 3.8 - 5.2 10e12/L    Hemoglobin 13.4 11.7 - 15.7 g/dL    Hematocrit 40.8 35.0 - 47.0 %    MCV 89 78 - 100 fl    MCH 29.3 26.5 - 33.0 pg    MCHC 32.8 31.5 - 36.5 g/dL    RDW 12.4 10.0 - 15.0 %    Platelet Count 198 150 - 450 10e9/L    Diff Method Automated Method     % Neutrophils 54.3 %    % Lymphocytes 38.0 %    % Monocytes 6.4 %    % Eosinophils 0.9 %    % Basophils 0.3 %    % Immature Granulocytes 0.1 %    Nucleated RBCs 0 0 /100    Absolute Neutrophil 3.8 1.6 - 8.3 10e9/L    Absolute Lymphocytes 2.6 0.8 - 5.3 10e9/L    Absolute Monocytes 0.4 0.0 - 1.3 10e9/L    Absolute Eosinophils 0.1 0.0 - 0.7 10e9/L    Absolute Basophils 0.0 0.0 - 0.2 10e9/L    Abs Immature Granulocytes 0.0 0 - 0.4 10e9/L    Absolute Nucleated RBC 0.0    HCG qualitative urine   Result Value Ref Range    HCG Qual Urine Negative NEG^Negative   Internal Medicine Adult IP Consult for [facility] Adult on 4A: Patient to be seen: ASAP within 4 hrs; Call back #: [phone]; jordan wants her new rash  evaluated for med reaction before she is discharged today; Consultant may enter orders: Yes; ...    Narrative    Carter Soria PA-C     8/31/2019 10:32 AM       Consult Note - Hospitalist Service       Date of Admission:  8/29/2019    Consult Requested by: Dr. Lebron  Reason for Consult: Rash      Assessment & Plan:     Libra Lieberman is a 23 year old female with history of MDD,   bipolar disorder, and schizoaffective disorder who was admitted   to medical floor 8/28/2019 following recent overdose and   attempted suicide. Following medical stabilization she was   transferred to inpatient psychiatry on 8/29/2019. She has been   cleared by psychiatry to discharge home. Medicine is consulted to   evaluate new rash.    1. Rash, probable furuncle:  Acute onset of <1 cm erythematous   lesion with associated swelling on anterior L hip on 8/30. No   pruritis on pain noted. Patient notes significant improvement   overnight. Appears to involve hair follicle, therefore likely   small furuncle that is now resolving. No rash elsewhere. Does not   have appearance of drug rash or SJS/TEN. I do not suspect   reaction to Lamictal but will defer decision to resume to   psychiatry team. Patient previously on Lamictal 100mg BID x 3   years without issues. Anticipate lesion will continue to resolve.   Offered to start Bacitracin ointment to ensure it does not become   secondarily infected, however patient declined. Encouraged   patient to go to primary care clinic, urgent care, or ED if area   gets worse.       The patient's care was discussed with the Bedside Nurse and   Patient.    Carter Soria PA-C  [facility] [place]    __________________________________________________________________  ____    Chief Complaint   Rash    History is obtained from the patient    History of Present Illness   Libra Lieberman is a 23 year old female who was admitted to   inpatient psychiatry following  "recent suicide attempt via   overdose. Patient has clinically improved and set for discharge   from inpatient psychiatry. Patient noted onset of small   erythematous area on anterior L hip last night. She had restarted   Lamictal on 8/29. When the psychiatry provider was updated about   the rash Lamictal was discontinued d/t concern for drug rash.   Patient reports that the erythematous lesion was initially   swollen, \"like a pimple\", but has since gotten much better. She   denies any pain or pruritis. The swelling has resolved today. She   denies any purulent drainage. The patient does not feel that this   is related to a drug reaction. She reports having a drug rash   secondary to Amoxicillin in the past and this bears no similar   resemblance.     She denies any other complaints on ROS.    Review of Systems   The 10 point Review of Systems is negative other than noted in   the HPI or here.     Past Medical History    I have reviewed this patient's medical history and updated it   with pertinent information if needed.   Past Medical History:   Diagnosis Date     Bipolar 1 disorder (H)      Depressive disorder      H/O magnetic resonance imaging of brain and brain stem   6/15/2017    MRI BRAIN [facility] CON4/21/2017 [facility] Result   Impression IMPRESSION: Chronic sequela of remote   neurocysticercosis at the right middle frontal gyrus, stable.   Calcification seen left anterior temporal lobe, better seen on   prior CT. No new intracranial lesion identified. Result Narrative   EXAM: MRI BRAIN WITHOUT AND WITH CONTRAST, 4/21/2017  CLINICAL   DATA: History of neurocysticercosi     Neurocysticercosis 2006     Schizo affective schizophrenia (H)        Past Surgical History   I have reviewed this patient's surgical history and updated it   with pertinent information if needed.  No past surgical history on file.    Social History   I have reviewed this patient's social history and updated it with "   pertinent information if needed.  Social History     Tobacco Use     Smoking status: Never Smoker     Smokeless tobacco: Never Used   Substance Use Topics     Alcohol use: Yes     Drug use: No       Family History   I have reviewed this patient's family history and updated it with   pertinent information if needed.   Family History   Problem Relation Age of Onset     Hypertension Mother      Diabetes Mother      Depression Mother      Lipids Mother      Asthma Brother      Cancer - colorectal Other 13        colon cancer     Thyroid Disease No family hx of      Heart Disease No family hx of      Unknown/Adopted No family hx of      Family History Negative No family hx of      C.A.D. No family hx of      Cerebrovascular Disease No family hx of      Breast Cancer No family hx of      Prostate Cancer No family hx of      Alcohol/Drug No family hx of      Allergies No family hx of      Alzheimer Disease No family hx of      Anesthesia Reaction No family hx of      Arthritis No family hx of      Blood Disease No family hx of      Cancer No family hx of      Cardiovascular No family hx of      Circulatory No family hx of      Congenital Anomalies No family hx of      Connective Tissue Disorder No family hx of      Endocrine Disease No family hx of      Eye Disorder No family hx of      Genetic Disorder No family hx of      Gastrointestinal Disease No family hx of      Genitourinary Problems No family hx of      Gynecology No family hx of      Musculoskeletal Disorder No family hx of      Neurologic Disorder No family hx of      Obesity No family hx of      Osteoporosis No family hx of      Psychotic Disorder No family hx of      Respiratory No family hx of      Hearing Loss No family hx of        Medications   I have reviewed this patient's current medications  Medications Prior to Admission   Medication Sig Dispense Refill Last Dose     [DISCONTINUED] acetaminophen (TYLENOL) 500 MG tablet Take   500-1,000 mg by mouth  daily as needed (head ache)   Unknown at   Unknown time     [DISCONTINUED] lamoTRIgine (LAMICTAL) 100 MG tablet Take 100 mg   by mouth 2 times daily   Past Month at Unknown time     [DISCONTINUED] QUEtiapine (SEROQUEL) 25 MG tablet Take 25 mg by   mouth daily   Unknown at Unknown time       Allergies   Allergies   Allergen Reactions     Bupropion      Other reaction(s): Seizures  Per patient(Wellbutrin )     Amoxicillin      High fever and hives     Penicillins Other (See Comments)     Increase BP and fever.     Levetiracetam Anxiety     Other reaction(s): Behavioral Disturbances   Developed worsening depression, reduced appetite   isturbances (Keppra)       Physical Exam   Vital Signs: Temp: 98.9  F (37.2  C) Temp src: Tympanic BP:   108/67 Pulse: 98            Weight: 227 lbs 12.8 oz    GENERAL:  Well developed female. Appears stated age. Awake.   Alert. Oriented x 3. NAD.    CV:  RRR. S1, S2. No murmurs appreciated.   RESPIRATORY:  Lungs CTAB with no wheezing, rales, rhonchi.   GI:  Abdomen soft, non-distended. Active bowel sounds. No   tenderness, guarding, or rebound. No mass or HSM.    NEUROLOGICAL:  No focal deficits. CN II-XII intact grossly. Moves   all extremities.    EXTREMITIES:  No peripheral edema. No calf tenderness. Intact   bilateral pedal pulses.   SKIN:  <1 cm erythematous macular lesion noted around single hair   follicle. No spreading erythema. No swelling or fluctuance. No   tenderness or warmth. No rash elsewhere.      Data   No labs today            Consults:   No consultations were requested during this admission         Hospital Course:   Libra Lieberman was admitted to Station 4A with attending Jj Miranda MD as a voluntary patient. The patient was placed under status 15 (15 minute checks) to ensure patient safety.    Patient transferred from unit 10A after she stabilized from overdose on medications.     The patient is concerned that she may gain weight on Seroquel.  The patient  is also complaining of sedation.  Discussed medications with the patient.  The patient wants to remain on Seroquel at this time.  Discontinue 25 mg of Seroquel scheduled in the morning.  We will continue 100 mg of Seroquel scheduled at night.  Discussed starting Latuda for patient to address both mood stabilization and depression, which would have less impact on her weight.  The patient reports that she will discuss Latuda with her outpatient provider.  The patient states that she felt best on Lamictal 100 mg b.i.d., initiating Lamictal titration at 25 mg.  Discussed with patient starting lithium 300 mg at bedtime until her Lamictal titration is at therapeutic level.  Discussed with patient that she can work with outpatient provider to either discontinue lithium or continue it if she feels it is helpful for her. Patient did not want to continue Depakote started on 10A due to weight possibilities. She is concerned about weight  Gain due to gaining weight with previous medications. The patient reported that gabapentin 100 mg t.i.d. p.r.n. was very helpful for her anxiety.  We will continue it.  Discussed risks, benefits and side effects of medication with patient.     Hcg is negative.      Libra Lieberman did participate in groups and was visible in the milieu. The patient's symptoms of suicidal ideation improved. Patient's mood improved and she denies suicidal ideation. Patient has protective factors of family ( and daughter), and seeking out treatment.     Patient no longer meets criteria for hospital level of care.     She is a low risk of relapse due to her lack of psychiatric history.    Libra Lieberman was released to home. At the time of discharge Libra Lieberman was determined to not be a danger to herself or others.          Discharge Medications:     Current Discharge Medication List      START taking these medications    Details   gabapentin (NEURONTIN) 100 MG capsule Take 1 capsule (100 mg) by  "mouth 3 times daily as needed (anxiety)  Qty: 90 capsule, Refills: 0    Associated Diagnoses: Anxiety      lithium ER (LITHOBID) 300 MG CR tablet Take 1 tablet (300 mg) by mouth At Bedtime  Qty: 30 tablet, Refills: 0    Associated Diagnoses: Schizoaffective disorder, bipolar type (H)         CONTINUE these medications which have CHANGED    Details   !! lamoTRIgine (LAMICTAL) 25 MG tablet Take 1 tablet (25 mg) by mouth At Bedtime  Qty: 12 tablet, Refills: 0    Associated Diagnoses: Schizoaffective disorder, bipolar type (H)      !! lamoTRIgine (LAMICTAL) 25 MG tablet Take 2 tablets (50 mg) by mouth daily  Qty: 14 tablet, Refills: 0    Associated Diagnoses: Schizoaffective disorder, bipolar type (H)      QUEtiapine (SEROQUEL) 100 MG tablet Take 1 tablet (100 mg) by mouth At Bedtime  Qty: 30 tablet, Refills: 0    Associated Diagnoses: Schizoaffective disorder, bipolar type (H)       !! - Potential duplicate medications found. Please discuss with provider.      STOP taking these medications       acetaminophen (TYLENOL) 500 MG tablet Comments:   Reason for Stopping:                    Psychiatric Examination:   Appearance:  awake, alert and adequately groomed  Attitude:  cooperative  Eye Contact:  good  Mood:  better  Affect:  appropriate and in normal range  Speech:  clear, coherent  Psychomotor Behavior:  no evidence of tardive dyskinesia, dystonia, or tics  Thought Process:  logical, linear and goal oriented  Associations:  no loose associations  Thought Content:  no evidence of suicidal ideation or homicidal ideation and auditory hallucinations present Patient reports she knows these are \"voices and are not real\".  Insight:  good  Judgment:  intact  Oriented to:  time, person, and place  Attention Span and Concentration:  intact  Recent and Remote Memory:  intact  Language: Able to name objects, Able to repeat phrases and Able to read and write  Fund of Knowledge: appropriate  Muscle Strength and Tone: normal  Gait " and Station: Normal         Discharge Plan:   Behavioral Discharge Planning and Instructions        Summary: You were admitted on 8/29/2019 to Station [address] [place] [address] due to suicidal ideation. You were treated by Debra Naegele, APRN, CNS and discharged on 08/31/2019 to Home     Principal Diagnosis:   Schizoaffective disorder, bipolar type.   Posttraumatic stress disorder.      Health Care Follow-up Appointments:   Medication Management  Date: 09/06/2019  Time: 4:00pm      Provider: Dr. Radha Jefferson  Address: [facility] [address] [place] [address]  [phone]   [facility] [place] faxed the AVS to this provider at Fax: [phone]       Therapy Appointment   Date: 09/04/2019  Time: 10:00am       Provider: Aj Tesfaye  Address: [facility] [address] [place] [id number]  [phone]   [facility] faxed the AVS to this provider at Fax: [phone]       AA Meetings  Sunday 7:30 pm Squad # 23 Group [place] [address] [place]   Monday 6:30 pm Squad # 4 Womens Group [place] [address] [place]   Monday 7:30 pm [place] Group Keyla Mukherjee [address] [place]   Monday 8:00 pm Squad # 7 Group [place] [address] [place]   Tuesday 7:30 pm Day By Day Group [facility] [address] [place]   Wednesday 6:30 pm [place] Today Squad #18 Group [place] [address] [place]   Wednesday 8:00 pm Squad # 5 Group [place] [address] [place]   Thursday 6:00 pm [facility] [employer] [facility] [place] [facility] [address] [place]   Thursday 7:30 pm Squad # 26 Group [facility] [address] [place]   Thursday 8:00 pm Squad # 9 Group [place] [address] [place]   Friday 6:30 pm [place] Squad # 21 [place] [facility] [place] [address] [facility]   Friday 7:00 pm Friday [facility] [address] [place]   Saturday 9:30 am [place] Today Squad #13 Group [place] Today [facility] [address]  [place]   Saturday 6:30 pm Squad # 6 [facility] Group [place] Tino [address] [place]     Attend all scheduled appointments with your outpatient providers. Call at least 24 hours in advance if you need to reschedule an appointment to ensure continued access to your outpatient providers.   Major Treatments, Procedures and Findings: You were provided with: a psychiatric assessment, assessed for medical stability, medication evaluation and/or management, group therapy, art therapy, milieu management, medical interventions and skills/OT groups.     Symptoms to Report: If you experience any of the following symptoms please report them right away to your provider or to family/friends; feeling more aggressive, increased confusion, losing more sleep, mood getting worse or thoughts of suicide.     Early warning signs can include: Early warning signs that could signal a potential relapse could include but not limited to the following; increased depression or anxiety sleep disturbances increased thoughts or behaviors of suicide or self-harm increased unusual thinking, such as paranoia or hearing voices.      Safety and Wellness:  Take all medicines as directed.  Make no changes unless your doctor suggests them. Follow treatment recommendations.  Refrain from alcohol and non-prescribed drugs. If there is a concern for safety, call 911.     Resources: Mental health crisis response for your [facility] is offered 24 hours a day, 7 days a week. A trained counselor will assess your current situation, offer support and counseling and connect you with local resources. Please call  [facility] [place], operated by [facility] Crisis Response [phone]     Crisis Intervention: 319.111.8573 or 016-691-8457 (TTY: 907.532.8033).  Call anytime for help.  National [place] on Mental Illness (www.[place].kay.org): 246.980.2047 or 497-621-8930.  Alcoholics Anonymous (www.alcoholics-anonymous.org): Check your phone book for your local chapter.  Suicide Awareness Voices of  "Education (SAVE) (www.save.org): 475-174-QODF (7283)  National Suicide Prevention Line (www.mentalhealthmn.org): 538-838-MDIH (9395)  Mental Health Consumer/Survivor Network of [place] (www.mhcsn.net): 562.938.5534 or 849-703-5853  Mental Health Association of [place] (www.mentalhealth.org): 600.986.1685 or 401-700-1732  Self- Management and Recovery Training., SMART-- Toll free: 965.679.4381  www.Multigig  Text 4 Life: txt \"LIFE\" to 83289 for immediate support and crisis intervention  Crisis text line: Text \"[place]\" to 853534. Free, confidential, 24/7.     The treatment team has appreciated the opportunity to work with you. Libra, please take care and make your recovery a daily recovery. If you have any questions or concerns our unit number is [phone].  You will be receiving a follow-up phone call within the next three days from a representative from behavioral health.  You have identified the best phone number to reach you as [phone] (home) NONE (work)        Attestation:  The patient has been seen and evaluated by me,  Debra A. Naegele, APRN CNS on 8/30/2019  Discharge summary time > 30 minutes   "
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
## 2019-08-30 NOTE — PROVIDER NOTIFICATION
Patient had an unremarkable shift.   Patient observed attending groups. She is social with peers and staff.   Patient will be discharging tomorrow. Per provider, the on call psychiatrist should evaluate her before discharging.     *observational charting*SI/SIB :  None observed  Auditory or visual hallucinations: denies  *observational charting* Anxiety: none observed  *observational charting* Depression: none observed  Appetite: good   Hygiene: well groomed     Denies concerns regarding sleep, appetite, medication side effects. Will continue to monitor.        08/30/19 1500   Behavioral Health   Thoughts/Cognition (WDL) WDL   Affect/Mood (WDL) WDL   ADL Assessment (WDL) WDL   Suicidality (WDL) WDL   Suicidality other (see comments)  (none observed)   1. Wish to be Dead (Past Month) No   2. Non-Specific Active Suicidal Thoughts (Past Month) No   Self Injury other (see comment)  (none observed)   Elopement (WDL) WDL   Activity (WDL) WDL   Speech (WDL) WDL   Medication Sensitivity (WDL) WDL   Psychomotor Gait (WDL) WDL   Overt Agression (WDL) WDL   Safety   Suicidality Status 15   Activities of Daily Living   Hygiene/Grooming independent   Oral Hygiene independent   Dress independent   Laundry unable to complete   Room Organization independent

## 2019-08-30 NOTE — PLAN OF CARE
INITIAL OT NOTE  Problem: OT General Care Plan  Goal: OT Goal 1  Pt will practice using >2 coping strategies to manage stress and reduce symptoms to demonstrate increased readiness for discharge.     Pt attended 1 out of 3 OT groups offered. Pt actively participated in occupational therapy clinic. Pt was able to ask for assistance as needed, and independently initiated a goal-directed task. Pt demonstrated good focus, planning, and attention to detail. Organized in her task approach. Social with peers and writer throughout. Calm, pleasant, and cooperative throughout this group. Affect appeared to brighten on approach.
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
## 2019-08-30 NOTE — DISCHARGE INSTRUCTIONS
Behavioral Discharge Planning and Instructions      Summary: You were admitted on 8/29/2019 to [facility] [address] [place] [address] due to suicidal ideation. You were treated by Debra Naegele, APRN, CNS and discharged on 08/31/2019 to Home    Principal Diagnosis:   Schizoaffective disorder, bipolar type.   Posttraumatic stress disorder.     Health Care Follow-up Appointments:   Medication Management  Date: 09/06/2019  Time: 4:00pm      Provider: Dr. Radha Jefferson  Address: [facility] [address], [place] [address]  [phone]   [facility] has faxed the AVS to this provider at Fax: [phone]      Therapy Appointment   Date: 09/04/2019  Time: 10:00am       Provider: Aj Tesfaye  Address: [facility] [address], [place] [address]  [phone]   [facility] has faxed the AVS to this provider at Fax: [phone]      AA Meetings  Sunday 7:30 pm Squad # 23 Group [place] [address] [place]   Monday 6:30 pm Squad # 4 Womens Group [place] [address] [place]   Monday 7:30 pm [place] [facility] Keyla Mukherjee [address] [place]   Monday 8:00 pm Squad # 7 Group [place] [address] [place]   Tuesday 7:30 pm Day By Day Group [facility] [address] [place]   Wednesday 6:30 pm [place] Squad #18 Group [place] [address] [place]   Wednesday 8:00 pm Squad # 5 Group [place] [address] [place]   Thursday 6:00 pm [facility] [employer] [facility] [place] [facility] [address] [place]   Thursday 7:30 pm Squad # 26 Group [facility] [address] [place]   Thursday 8:00 pm Squad # 9 Group [place] [address] [place]   Friday 6:30 pm [place] Squad # 21 [place] [address] [place]   Friday 7:00 pm Friday [facility] [address] [place]   Saturday 9:30 am [place] [facility] Squad #13 Group [place] [facility] [address] [facility]   Saturday 6:30 pm Squad # 6 A.A. Group [place]  Today [facility]    Attend all scheduled appointments with your outpatient providers. Call at least 24 hours in advance if you need to reschedule an appointment to ensure continued access to your outpatient providers.   Major Treatments, Procedures and Findings: You were provided with: a psychiatric assessment, assessed for medical stability, medication evaluation and/or management, group therapy, art therapy, milieu management, medical interventions and skills/OT groups.    Symptoms to Report: If you experience any of the following symptoms please report them right away to your provider or to family/friends; feeling more aggressive, increased confusion, losing more sleep, mood getting worse or thoughts of suicide.    Early warning signs can include: Early warning signs that could signal a potential relapse could include but not limited to the following; increased depression or anxiety sleep disturbances increased thoughts or behaviors of suicide or self-harm increased unusual thinking, such as paranoia or hearing voices.     Safety and Wellness:  Take all medicines as directed.  Make no changes unless your doctor suggests them. Follow treatment recommendations.  Refrain from alcohol and non-prescribed drugs. If there is a concern for safety, call 911.    Resources: Mental health crisis response for your [facility] is offered 24 hours a day, 7 days a week. A trained counselor will assess your current situation, offer support and counseling and connect you with local resources. Please call  [facility] Crisis Response [phone]    Crisis Intervention: 553.818.8814 or 430-586-2821 (TTY: 255.645.4962).  Call anytime for help.  National [place] on Mental Illness (www.mn.kay.org): 444.732.1625 or 330-076-1437.  Alcoholics Anonymous (www.alcoholics-anonymous.org): Check your phone book for your local chapter.  Suicide Awareness Voices of Education (SAVE) (www.save.org): 018-280-YLYP (6957)  National  "Suicide Prevention Line (www.mentalhealthmn.org): 915-644-CXOX (0968)  Mental Health Consumer/Survivor Network of [place] (www.mhcsn.net): 326.897.6877 or 509-825-7485  Mental Health Association of [place] (www.mentalhealth.org): 399.981.1659 or 231-534-7208  Self- Management and Recovery Training., SMART-- Toll free: 912.372.2367  IMNEXT.[place]  Text 4 Life: txt \"LIFE\" to 18369 for immediate support and crisis intervention  Crisis text line: Text \"[place]\" to 997008. Free, confidential, 24/7.    The treatment team has appreciated the opportunity to work with you. Libra, please take care and make your recovery a daily recovery. If you have any questions or concerns our unit number is [phone].  You will be receiving a follow-up phone call within the next three days from a representative from behavioral health.  You have identified the best phone number to reach you as [phone] (home) NONE (work)      "

## 2019-08-30 NOTE — PLAN OF CARE
Problem: Suicide Risk  Goal: Absence of Self-Harm  8/29/2019 2054 by Sonali Hurley RN  Outcome: Improving   Pt has been calm, social and visible in the milieu.  Pt did not attend and participate in all groups.  Pt reports confusion over her new diagnosis of Bipolar and schizoaffective disorder.  Pt did admit she wanted to die when she overdosed on the medications because she is unsure of how her life will turn out.  Pt reports she does not want to live a life that is medication and hospital dependent.  Pt states she is scared of the future and also hope her daughter does not develop the same diagnosis as her or her  does not abandon her when she is in crisis.  Pt states she feels much better today and hopes to be discharged tomorrow.  Pt's vitals have been stable.  Only lingering effect from the overdose is slight dizziness per pt. Pt has been up ad marti, eating and drinking without any problems.  Will continue to monitor and offer support as needed.  Poison control also called enquiring about pt's health.  They were told pt is stable at this time.

## 2019-08-30 NOTE — PLAN OF CARE
BEHAVIORAL TEAM DISCUSSION    Participants: 4A Provider: Debra Naegele, APRN, CNS; 4A RN's: Jennifer Torres RN ; 4A CTC's: Ochoa Salinas (CTC).  Progress: No Change.  Continued Stay Criteria/Rationale: Suicidal Ideation   Medical/Physical: neurocystticercosis    Precautions:    Behavioral Orders   Procedures     Code 1 - Restrict to Unit     Routine Programming     As clinically indicated     Status 15     Every 15 minutes.     Suicide precautions     Patients on Suicide Precautions should have a Combination Diet ordered that includes a Diet selection(s) AND a Behavioral Tray selection for Safe Tray - with utensils, or Safe Tray - NO utensils       Plan: The following services will be provided to the patient; psychiatric assessment, medication management, therapeutic milieu, individual and group support, art therapy, and skills/OT groups.   Rationale for change in precautions or plan: N/A

## 2019-08-30 NOTE — PROGRESS NOTES
08/30/19 1800   Behavioral Health   Hallucinations denies / not responding to hallucinations   Thinking intact   Orientation person: oriented;place: oriented;date: oriented;time: oriented   Memory baseline memory   Insight poor   Judgement impaired   Eye Contact at examiner   Affect full range affect   Mood mood is calm   Physical Appearance/Attire attire appropriate to age and situation   Hygiene well groomed   Suicidality other (see comments)  (pt deneid SI)   1. Wish to be Dead (Past Month) Yes   2. Non-Specific Active Suicidal Thoughts (Past Month) Yes   Self Injury other (see comment)  (pt denied SIB)   Elopement   (no concerns)   Activity other (see comment)  (pt is social adn visible in milieu)   Speech coherent;clear   Medication Sensitivity no observed side effects;no stated side effects   Psychomotor / Gait steady;balanced   Activities of Daily Living   Hygiene/Grooming independent   Oral Hygiene independent   Dress independent   Room Organization independent     Pt denied SI, SIB and HI. Pt denied any hallucinations at this time. Pt was cooperative at this time. Pt shows full range of affect. Pt reported a migraine. Pt reported her mood is up and down. Pt is hopeful her medication will stabilize her mood. Pt reported that pt 422-2 has been having poor boundaries with her and she feels irritated by her.

## 2019-08-31 PROCEDURE — 99221 1ST HOSP IP/OBS SF/LOW 40: CPT | Performed by: PHYSICIAN ASSISTANT

## 2019-08-31 PROCEDURE — 25000132 ZZH RX MED GY IP 250 OP 250 PS 637: Performed by: CLINICAL NURSE SPECIALIST

## 2019-08-31 PROCEDURE — 99207 ZZC CONSULT E&M CHANGED TO INITIAL LEVEL: CPT | Performed by: PHYSICIAN ASSISTANT

## 2019-08-31 RX ORDER — LAMOTRIGINE 25 MG/1
25 TABLET ORAL DAILY
Status: DISCONTINUED | OUTPATIENT
Start: 2019-09-12 | End: 2019-08-31

## 2019-08-31 RX ORDER — LAMOTRIGINE 25 MG/1
25 TABLET ORAL DAILY
Status: DISCONTINUED | OUTPATIENT
Start: 2019-08-31 | End: 2019-08-31 | Stop reason: HOSPADM

## 2019-08-31 RX ADMIN — ACETAMINOPHEN 1000 MG: 500 TABLET ORAL at 10:48

## 2019-08-31 RX ADMIN — GABAPENTIN 100 MG: 100 CAPSULE ORAL at 10:48

## 2019-08-31 ASSESSMENT — ACTIVITIES OF DAILY LIVING (ADL)
ORAL_HYGIENE: INDEPENDENT
LAUNDRY: UNABLE TO COMPLETE
DRESS: INDEPENDENT
HYGIENE/GROOMING: INDEPENDENT

## 2019-08-31 NOTE — PROGRESS NOTES
Patient discharged to home. Escorted to ride outside of [facility]. Her thoughts are clear and organized.  Mood is stable. Patient seen and cleared by IM as requested by Dr. Lebron for possible skin rash. All valuables returned to patient. Patient requested to have home meds that she brought in Citalopram 20mg, Lorazepam 1mg,and Lamotrigine 100mg destroyed by hospital. Discussed discharge instructions. Take home meds sent with patient. She has been med compliant. Denies SI and SIB. Cara Garcia RN

## 2019-08-31 NOTE — PROGRESS NOTES
Participated in Music Therapy group with focus on mood elevation, validation and decreasing anxiety and improved group cohesiveness. Engaged and cooperative in music listening interventions.   Showed progress in session goals.  Warm, bright affect-showed good leadership skills in group.

## 2019-08-31 NOTE — CONSULTS
Consult Note - Hospitalist Service       Date of Admission:  8/29/2019    Consult Requested by: Dr. Lebron  Reason for Consult: Rash      Assessment & Plan:     Libra Lieberman is a 23 year old female with history of MDD, bipolar disorder, and schizoaffective disorder who was admitted to medical floor 8/28/2019 following recent overdose and attempted suicide. Following medical stabilization she was transferred to inpatient psychiatry on 8/29/2019. She has been cleared by psychiatry to discharge home. Medicine is consulted to evaluate new rash.    1. Rash, probable furuncle:  Acute onset of <1 cm erythematous lesion with associated swelling on anterior L hip on 8/30. No pruritis on pain noted. Patient notes significant improvement overnight. Appears to involve hair follicle, therefore likely small furuncle that is now resolving. No rash elsewhere. Does not have appearance of drug rash or SJS/TEN. I do not suspect reaction to Lamictal but will defer decision to resume to psychiatry team. Patient previously on Lamictal 100mg BID x 3 years without issues. Anticipate lesion will continue to resolve. Offered to start Bacitracin ointment to ensure it does not become secondarily infected, however patient declined. Encouraged patient to go to primary care clinic, urgent care, or ED if area gets worse.       The patient's care was discussed with the Bedside Nurse and Patient.    Carter Soria PA-C  [facility], [place]    ______________________________________________________________________    Chief Complaint   Rash    History is obtained from the patient    History of Present Illness   Libra Lieberman is a 23 year old female who was admitted to inpatient psychiatry following recent suicide attempt via overdose. Patient has clinically improved and set for discharge from inpatient psychiatry. Patient noted onset of small erythematous area on anterior L hip last night. She had  "restarted Lamictal on 8/29. When the psychiatry provider was updated about the rash Lamictal was discontinued d/t concern for drug rash. Patient reports that the erythematous lesion was initially swollen, \"like a pimple\", but has since gotten much better. She denies any pain or pruritis. The swelling has resolved today. She denies any purulent drainage. The patient does not feel that this is related to a drug reaction. She reports having a drug rash secondary to Amoxicillin in the past and this bears no similar resemblance.     She denies any other complaints on ROS.    Review of Systems   The 10 point Review of Systems is negative other than noted in the HPI or here.     Past Medical History    I have reviewed this patient's medical history and updated it with pertinent information if needed.   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 Calcification seen left anterior temporal lobe, better seen on prior CT. No new intracranial lesion identified. Result Narrative EXAM: MRI BRAIN WITHOUT AND WITH CONTRAST, 4/21/2017  CLINICAL DATA: History of neurocysticercosi     Neurocysticercosis 2006     Schizo affective schizophrenia (H)        Past Surgical History   I have reviewed this patient's surgical history and updated it with pertinent information if needed.  No past surgical history on file.    Social History   I have reviewed this patient's social history and updated it with pertinent information if needed.  Social History     Tobacco Use     Smoking status: Never Smoker     Smokeless tobacco: Never Used   Substance Use Topics     Alcohol use: Yes     Drug use: No       Family History   I have reviewed this patient's family " history and updated it with pertinent information if needed.   Family History   Problem Relation Age of Onset     Hypertension Mother      Diabetes Mother      Depression Mother      Lipids Mother      Asthma Brother      Cancer - colorectal Other 13        colon cancer     Thyroid Disease No family hx of      Heart Disease No family hx of      Unknown/Adopted No family hx of      Family History Negative No family hx of      C.A.D. No family hx of      Cerebrovascular Disease No family hx of      Breast Cancer No family hx of      Prostate Cancer No family hx of      Alcohol/Drug No family hx of      Allergies No family hx of      Alzheimer Disease No family hx of      Anesthesia Reaction No family hx of      Arthritis No family hx of      Blood Disease No family hx of      Cancer No family hx of      Cardiovascular No family hx of      Circulatory No family hx of      Congenital Anomalies No family hx of      Connective Tissue Disorder No family hx of      Endocrine Disease No family hx of      Eye Disorder No family hx of      Genetic Disorder No family hx of      Gastrointestinal Disease No family hx of      Genitourinary Problems No family hx of      Gynecology No family hx of      Musculoskeletal Disorder No family hx of      Neurologic Disorder No family hx of      Obesity No family hx of      Osteoporosis No family hx of      Psychotic Disorder No family hx of      Respiratory No family hx of      Hearing Loss No family hx of        Medications   I have reviewed this patient's current medications  Medications Prior to Admission   Medication Sig Dispense Refill Last Dose     [DISCONTINUED] acetaminophen (TYLENOL) 500 MG tablet Take 500-1,000 mg by mouth daily as needed (head ache)   Unknown at Unknown time     [DISCONTINUED] lamoTRIgine (LAMICTAL) 100 MG tablet Take 100 mg by mouth 2 times daily   Past Month at Unknown time     [DISCONTINUED] QUEtiapine (SEROQUEL) 25 MG tablet Take 25 mg by mouth daily    Unknown at Unknown time       Allergies   Allergies   Allergen Reactions     Bupropion      Other reaction(s): Seizures  Per patient(Wellbutrin )     Amoxicillin      High fever and hives     Penicillins Other (See Comments)     Increase BP and fever.     Levetiracetam Anxiety     Other reaction(s): Behavioral Disturbances   Developed worsening depression, reduced appetite   isturbances (Keppra)       Physical Exam   Vital Signs: Temp: 98.9  F (37.2  C) Temp src: Tympanic BP: 108/67 Pulse: 98            Weight: 227 lbs 12.8 oz    GENERAL:  Well developed female. Appears stated age. Awake. Alert. Oriented x 3. NAD.    CV:  RRR. S1, S2. No murmurs appreciated.   RESPIRATORY:  Lungs CTAB with no wheezing, rales, rhonchi.   GI:  Abdomen soft, non-distended. Active bowel sounds. No tenderness, guarding, or rebound. No mass or HSM.    NEUROLOGICAL:  No focal deficits. CN II-XII intact grossly. Moves all extremities.    EXTREMITIES:  No peripheral edema. No calf tenderness. Intact bilateral pedal pulses.   SKIN:  <1 cm erythematous macular lesion noted around single hair follicle. No spreading erythema. No swelling or fluctuance. No tenderness or warmth. No rash elsewhere.      Data   No labs today

## 2019-08-31 NOTE — PLAN OF CARE
Pt brought to my attention a red raised bump on her hip. Initially she said it itched but later stated she thought she was just scratching it because it was there. There is also another red patch several inches below that though that area is not raised. Pt stated it just started today. Dr Lebron notified and order received to discontinue lamictal.

## 2019-09-03 ENCOUNTER — TELEPHONE (OUTPATIENT)
Dept: FAMILY MEDICINE | Facility: CLINIC | Age: 24
End: 2019-09-03

## 2019-09-04 ENCOUNTER — PATIENT OUTREACH (OUTPATIENT)
Dept: CARE COORDINATION | Facility: CLINIC | Age: 24
End: 2019-09-04

## 2019-09-04 NOTE — LETTER
[facility] - [facility]  [address] [place] [address]    September 4, 2019    Libra Lieberman  [address]   [place] [address]      Dear Libra,    I am a clinic care coordinator who works at the [facility]. I have been trying to reach you recently to introduce Clinic Care Coordination and to see if there was anything I could assist you with.  I wanted to introduce myself and provide you with my contact information so that you can call me with questions or concerns about your health care. Below is a description of clinic care coordination and how I can further assist you.     The clinic care coordinator is a registered nurse and/or  who understand the health care system. The goal of clinic care coordination is to help you manage your health and improve access to the [facility] system in the most efficient manner. The registered nurse can assist you in meeting your health care goals by providing education, coordinating services, and strengthening the communication among your providers. The  can assist you with financial, behavioral, psychosocial, chemical dependency, counseling, and/or psychiatric resources.    Please feel free to contact me at [phone], with any questions or concerns. We at [facility] are focused on providing you with the highest-quality healthcare experience possible and that all starts with you.     Sincerely,     VAHID Franco

## 2019-09-04 NOTE — PROGRESS NOTES
Clinic Care Coordination Contact  [facility]/Voicemail    Referral Source: Care Team - IP admission 8/29/19 - 8/31/19 with diagnoses of schizoaffective disorder and history of bipolar disorder  Clinical Data: Care Coordinator Outreach  Outreach attempted x 1.  Left message on patient's voicemail with call back information and requested return call.  Plan: Care Coordinator will send care coordination introduction letter with care coordinator contact information and explanation of care coordination services via [employer]. Care Coordinator will try to reach patient again in 1-2 business days.    VAHID Franco  [facility]   Care Coordination  [facility]  [phone]

## 2019-09-04 NOTE — TELEPHONE ENCOUNTER
SW reviewed chart for recent IP discharge. SW will plan to outreach. Please see Care Coordination outreach encounter for details. Closing encounter.    VAHID Franco  [facility]   Care Coordination  [facility]  [phone]

## 2019-09-04 NOTE — TELEPHONE ENCOUNTER
Patient discharged from [facility]  ( inpatient or ER).    Discharge location: [facility]  Discharge date: SAT 31-AUG-2019  Diagnosis: Schizoaffective Disorder, Bipolar Type (H)    Please follow up as appropriate. If no follow up required, please close encounter.

## 2019-09-05 NOTE — PROGRESS NOTES
Clinic Care Coordination Contact  [facility]/Voicemail    Referral Source: Care Team - IP admission 8/29/19 - 8/31/19 with diagnoses of schizoaffective disorder and history of bipolar disorder  Clinical Data: Care Coordinator Outreach  Outreach attempted x 2.  Left message on patient's voicemail with call back information and requested return call.  Plan: Care Coordinator sent care coordination introduction letter on 9/4/19 via [employer]. Care Coordinator will do no further outreaches at this time.    VAHID Franco  [facility]   Care Coordination  [facility]  [phone]

## 2020-02-16 ENCOUNTER — HEALTH MAINTENANCE LETTER (OUTPATIENT)
Age: 25
End: 2020-02-16

## 2020-11-22 ENCOUNTER — HEALTH MAINTENANCE LETTER (OUTPATIENT)
Age: 25
End: 2020-11-22

## 2021-04-10 ENCOUNTER — HEALTH MAINTENANCE LETTER (OUTPATIENT)
Age: 26
End: 2021-04-10

## 2021-09-19 ENCOUNTER — HEALTH MAINTENANCE LETTER (OUTPATIENT)
Age: 26
End: 2021-09-19

## 2022-02-17 PROBLEM — Z92.89 H/O MAGNETIC RESONANCE IMAGING OF BRAIN AND BRAIN STEM: Status: ACTIVE | Noted: 2017-06-15

## 2022-05-01 ENCOUNTER — HEALTH MAINTENANCE LETTER (OUTPATIENT)
Age: 27
End: 2022-05-01

## 2022-11-21 ENCOUNTER — HEALTH MAINTENANCE LETTER (OUTPATIENT)
Age: 27
End: 2022-11-21

## 2023-06-02 ENCOUNTER — HEALTH MAINTENANCE LETTER (OUTPATIENT)
Age: 28
End: 2023-06-02

## 2024-06-22 ENCOUNTER — HEALTH MAINTENANCE LETTER (OUTPATIENT)
Age: 29
End: 2024-06-22